# Patient Record
Sex: FEMALE | Race: WHITE | Employment: FULL TIME | URBAN - METROPOLITAN AREA
[De-identification: names, ages, dates, MRNs, and addresses within clinical notes are randomized per-mention and may not be internally consistent; named-entity substitution may affect disease eponyms.]

---

## 2018-06-13 ENCOUNTER — OFFICE VISIT (OUTPATIENT)
Dept: URGENT CARE | Age: 58
End: 2018-06-13

## 2018-06-13 VITALS
HEIGHT: 70 IN | SYSTOLIC BLOOD PRESSURE: 110 MMHG | RESPIRATION RATE: 12 BRPM | TEMPERATURE: 98.7 F | HEART RATE: 68 BPM | WEIGHT: 140 LBS | BODY MASS INDEX: 20.04 KG/M2 | DIASTOLIC BLOOD PRESSURE: 60 MMHG

## 2018-06-13 DIAGNOSIS — L50.9 URTICARIA: Primary | ICD-10-CM

## 2018-06-13 PROCEDURE — 99202 OFFICE O/P NEW SF 15 MIN: CPT | Performed by: PHYSICIAN ASSISTANT

## 2018-06-13 PROCEDURE — 96372 THER/PROPH/DIAG INJ SC/IM: CPT | Performed by: PHYSICIAN ASSISTANT

## 2018-06-13 RX ORDER — PREDNISONE 20 MG/1
TABLET ORAL
Qty: 18 TABLET | Refills: 0 | Status: SHIPPED | OUTPATIENT
Start: 2018-06-13 | End: 2018-09-10

## 2018-06-13 RX ORDER — DEXAMETHASONE SODIUM PHOSPHATE 10 MG/ML
10 INJECTION INTRAMUSCULAR; INTRAVENOUS ONCE
Status: COMPLETED | OUTPATIENT
Start: 2018-06-13 | End: 2018-06-13

## 2018-06-13 RX ORDER — HYDROXYZINE HYDROCHLORIDE 25 MG/1
25 TABLET, FILM COATED ORAL 3 TIMES DAILY PRN
Qty: 20 TABLET | Refills: 0 | Status: SHIPPED | OUTPATIENT
Start: 2018-06-13 | End: 2018-06-23

## 2018-06-13 RX ADMIN — DEXAMETHASONE SODIUM PHOSPHATE 10 MG: 10 INJECTION INTRAMUSCULAR; INTRAVENOUS at 15:58

## 2018-06-14 ASSESSMENT — ENCOUNTER SYMPTOMS
WHEEZING: 0
SHORTNESS OF BREATH: 0
VOMITING: 0
ABDOMINAL PAIN: 0
NAUSEA: 0

## 2018-09-10 ENCOUNTER — OFFICE VISIT (OUTPATIENT)
Dept: INTERNAL MEDICINE CLINIC | Age: 58
End: 2018-09-10

## 2018-09-10 VITALS
RESPIRATION RATE: 10 BRPM | WEIGHT: 136.8 LBS | DIASTOLIC BLOOD PRESSURE: 64 MMHG | BODY MASS INDEX: 20.26 KG/M2 | SYSTOLIC BLOOD PRESSURE: 112 MMHG | HEIGHT: 69 IN | HEART RATE: 76 BPM

## 2018-09-10 DIAGNOSIS — Z13.220 LIPID SCREENING: ICD-10-CM

## 2018-09-10 DIAGNOSIS — Z12.39 BREAST CANCER SCREENING: ICD-10-CM

## 2018-09-10 DIAGNOSIS — L85.3 DRY SKIN: ICD-10-CM

## 2018-09-10 DIAGNOSIS — Z71.85 VACCINE COUNSELING: ICD-10-CM

## 2018-09-10 DIAGNOSIS — Z72.0 TOBACCO USE: ICD-10-CM

## 2018-09-10 DIAGNOSIS — K90.0 CELIAC SPRUE: ICD-10-CM

## 2018-09-10 DIAGNOSIS — Z00.00 WELL ADULT EXAM: ICD-10-CM

## 2018-09-10 DIAGNOSIS — Z12.11 COLON CANCER SCREENING: ICD-10-CM

## 2018-09-10 DIAGNOSIS — Z23 NEED FOR TDAP VACCINATION: Primary | ICD-10-CM

## 2018-09-10 DIAGNOSIS — Z78.0 MENOPAUSE: ICD-10-CM

## 2018-09-10 LAB
BASOPHILS ABSOLUTE: 0.1 K/UL (ref 0–0.2)
BASOPHILS RELATIVE PERCENT: 0.9 %
EOSINOPHILS ABSOLUTE: 0.2 K/UL (ref 0–0.6)
EOSINOPHILS RELATIVE PERCENT: 3.1 %
HCT VFR BLD CALC: 45.4 % (ref 36–48)
HEMOGLOBIN: 15.1 G/DL (ref 12–16)
LYMPHOCYTES ABSOLUTE: 1.8 K/UL (ref 1–5.1)
LYMPHOCYTES RELATIVE PERCENT: 27.6 %
MCH RBC QN AUTO: 31.7 PG (ref 26–34)
MCHC RBC AUTO-ENTMCNC: 33.1 G/DL (ref 31–36)
MCV RBC AUTO: 95.6 FL (ref 80–100)
MONOCYTES ABSOLUTE: 0.6 K/UL (ref 0–1.3)
MONOCYTES RELATIVE PERCENT: 9.7 %
NEUTROPHILS ABSOLUTE: 3.7 K/UL (ref 1.7–7.7)
NEUTROPHILS RELATIVE PERCENT: 58.7 %
PDW BLD-RTO: 14.4 % (ref 12.4–15.4)
PLATELET # BLD: 306 K/UL (ref 135–450)
PMV BLD AUTO: 9.7 FL (ref 5–10.5)
RBC # BLD: 4.75 M/UL (ref 4–5.2)
WBC # BLD: 6.4 K/UL (ref 4–11)

## 2018-09-10 PROCEDURE — 99386 PREV VISIT NEW AGE 40-64: CPT | Performed by: INTERNAL MEDICINE

## 2018-09-10 PROCEDURE — 90715 TDAP VACCINE 7 YRS/> IM: CPT | Performed by: INTERNAL MEDICINE

## 2018-09-10 PROCEDURE — 90471 IMMUNIZATION ADMIN: CPT | Performed by: INTERNAL MEDICINE

## 2018-09-10 RX ORDER — VARENICLINE TARTRATE 25 MG
KIT ORAL
Qty: 53 EACH | Refills: 0 | Status: SHIPPED | OUTPATIENT
Start: 2018-09-10 | End: 2018-11-05 | Stop reason: ALTCHOICE

## 2018-09-10 RX ORDER — VARENICLINE TARTRATE 1 MG/1
1 TABLET, FILM COATED ORAL 2 TIMES DAILY
Qty: 60 TABLET | Refills: 5 | Status: SHIPPED | OUTPATIENT
Start: 2018-09-10 | End: 2018-11-05 | Stop reason: ALTCHOICE

## 2018-09-10 ASSESSMENT — ENCOUNTER SYMPTOMS
CHEST TIGHTNESS: 0
CONSTIPATION: 0
SHORTNESS OF BREATH: 0
DIARRHEA: 0

## 2018-09-10 ASSESSMENT — PATIENT HEALTH QUESTIONNAIRE - PHQ9
1. LITTLE INTEREST OR PLEASURE IN DOING THINGS: 0
SUM OF ALL RESPONSES TO PHQ QUESTIONS 1-9: 0
SUM OF ALL RESPONSES TO PHQ QUESTIONS 1-9: 0
2. FEELING DOWN, DEPRESSED OR HOPELESS: 0
SUM OF ALL RESPONSES TO PHQ9 QUESTIONS 1 & 2: 0

## 2018-09-10 NOTE — PROGRESS NOTES
days, then one tablet po qd x 3 days (Patient taking differently: Take one tablet po TID x 3 days, one tablet po BID x 3 days, then one tablet po qd x 3 days) 18 tablet 0     No facility-administered medications prior to visit. Patient's past medical history, surgical history, family history, medications,  and allergies  were all reviewed and updated as appropriate today. Review of Systems   Constitutional: Negative for appetite change, fatigue and fever. Respiratory: Negative for chest tightness and shortness of breath. Cardiovascular: Negative for chest pain. Gastrointestinal: Negative for constipation and diarrhea. Skin: Negative for rash. /64 (Site: Right Upper Arm)   Pulse 76   Resp 10   Ht 5' 9.25\" (1.759 m)   Wt 136 lb 12.8 oz (62.1 kg)   BMI 20.06 kg/m²   Physical Exam   Constitutional: She is oriented to person, place, and time. She appears well-developed and well-nourished. She is cooperative. She does not appear ill. No distress. HENT:   Head: Normocephalic. Right Ear: Tympanic membrane, external ear and ear canal normal.   Left Ear: Tympanic membrane, external ear and ear canal normal.   Nose: Nose normal. No mucosal edema or rhinorrhea. Mouth/Throat: Oropharynx is clear and moist and mucous membranes are normal.   Eyes: Pupils are equal, round, and reactive to light. Conjunctivae and EOM are normal. Right eye exhibits no discharge. Left eye exhibits no discharge. No scleral icterus. Neck: Normal range of motion. Neck supple. Carotid bruit is not present. No thyroid mass and no thyromegaly present. Cardiovascular: Normal rate, regular rhythm, normal heart sounds and intact distal pulses. No murmur heard. Pulses:       Dorsalis pedis pulses are 2+ on the right side, and 2+ on the left side. No LE edema   Pulmonary/Chest: Effort normal. She has no wheezes. She has no rales. She exhibits no tenderness. Abdominal: Soft.  Bowel sounds are normal. She exhibits no mass. There is no tenderness. Lymphadenopathy:     She has no cervical adenopathy. Neurological: She is alert and oriented to person, place, and time. Skin: Skin is warm and dry. No pallor. Psychiatric: She has a normal mood and affect. Her behavior is normal. Judgment and thought content normal.       ASSESSMENT/PLAN:    Problem List Items Addressed This Visit     Well adult exam     No problems identified on exam. Patient wants to quit smoking and this has been discussed. Orders given for mammogram and colonoscopy. Check fasting labs. TDaP given today and will get her flu shot in early-mid October. Vaccine counseling     Recommend TDaP now as she is expecting her first grandchild in October. Recommend flu shot in October. Tobacco use     Wants to quit smoking. After discussing medication and non-medication options and she would like to try Chantix. Risks of Chantix discussed with patient. Also discussed life style changes to help with quitting- limiting locations that she smokes, nicotine replacement. Physical vs emotional addiction to cigarettes. Relevant Medications    varenicline (CHANTIX STARTING MONTH PAK) 0.5 MG X 11 & 1 MG X 42 tablet    varenicline (CHANTIX CONTINUING MONTH PAK) 1 MG tablet    Menopause     Check DEXA scan, particularly with low BMI and Celiac disease. Dry skin     Recommend using OTC hydrocortisone cream applying a thin layer to affected areas twice daily as needed for no longer than 2 weeks. Colon cancer screening     Overdue for screening colonoscopy. Order given. Relevant Orders    COLONOSCOPY (Screening)    Celiac sprue     Diagnosed in her 20's and well controlled with diet. Does not follow with GI. Is due for colonoscopy, will need to see GI again.           Relevant Orders    Comprehensive Metabolic Panel (Completed)    Vitamin D 25 Hydroxy (Completed)    TSH with Reflex (Completed)    CBC Auto Differential (Completed)    Vitamin B12 & Folate (Completed)    Magnesium (Completed)    DEXA Bone Density Axial Skeleton    Breast cancer screening     Overdue for mammogram. Order given. Relevant Orders    MELISSA TESSIE DIGITAL SCREEN BILATERAL      Other Visit Diagnoses     Need for Tdap vaccination    -  Primary    Relevant Orders    Tdap (age 10y-63y) IM (ADACEL) (Completed)    Lipid screening        Relevant Orders    Lipid Panel (Completed)          Current Outpatient Prescriptions   Medication Sig Dispense Refill    varenicline (CHANTIX STARTING MONTH DARNELL) 0.5 MG X 11 & 1 MG X 42 tablet Take by mouth with food as directed by pack. 53 each 0    varenicline (CHANTIX CONTINUING MONTH PAK) 1 MG tablet Take 1 tablet by mouth 2 times daily Take with food 60 tablet 5     No current facility-administered medications for this visit. Return in about 6 weeks (around 10/22/2018) for sooner if needed.

## 2018-09-10 NOTE — PATIENT INSTRUCTIONS
Start Chantinx Starting Kit first and then take the prescription for the 1 mg pills. Take with food. Start Chantix and pick a quit date 1-2 weeks after you start the medication. Chantix will help decrease your craving for cigarettes and sometimes people stop smoking sooner than their chosen quit date. I do recommend taking Chantix for at least 3 months, ideally 6 months. You may use nicotine replacement, such as nicotine gum or patches, along with Chantix. The most common side effects with Chantix are nausea, so make sure to take with a full meal, and nightmares. These may be dose dependent, meaning that at lower doses you will not have the side effects. If this is the case, we can drop your dose down to the the lower dose which is often effective. Rarely, patients may experience worsening depression/new onset depression and/or suicidal thoughts. If this happens, please stop Chantix immediately and call the office. Try over the counter hydrocortisone cream apply a thin layer twice a day for no more than 2 weeks at a time, take a 2 week break between episodes to avoid thinning your skin. Patient Education        Stopping Smoking: Care Instructions  Your Care Instructions  Cigarette smokers crave the nicotine in cigarettes. Giving it up is much harder than simply changing a habit. Your body has to stop craving the nicotine. It is hard to quit, but you can do it. There are many tools that people use to quit smoking. You may find that combining tools works best for you. There are several steps to quitting. First you get ready to quit. Then you get support to help you. After that, you learn new skills and behaviors to become a nonsmoker. For many people, a necessary step is getting and using medicine. Your doctor will help you set up the plan that best meets your needs. You may want to attend a smoking cessation program to help you quit smoking.  When you choose a program, look for one that has proven skip a coffee break with coworkers who smoke. ¨ Change your daily routine. Take a different route to work or eat a meal in a different place. · Cut down on stress. Calm yourself or release tension by doing an activity you enjoy, such as reading a book, taking a hot bath, or gardening. · Talk to your doctor or pharmacist about nicotine replacement therapy, which replaces the nicotine in your body. You still get nicotine but you do not use tobacco. Nicotine replacement products help you slowly reduce the amount of nicotine you need. These products come in several forms, many of them available over-the-counter:  ¨ Nicotine patches  ¨ Nicotine gum and lozenges  ¨ Nicotine inhaler  · Ask your doctor about bupropion (Wellbutrin) or varenicline (Chantix), which are prescription medicines. They do not contain nicotine. They help you by reducing withdrawal symptoms, such as stress and anxiety. · Some people find hypnosis, acupuncture, and massage helpful for ending the smoking habit. · Eat a healthy diet and get regular exercise. Having healthy habits will help your body move past its craving for nicotine. · Be prepared to keep trying. Most people are not successful the first few times they try to quit. Do not get mad at yourself if you smoke again. Make a list of things you learned and think about when you want to try again, such as next week, next month, or next year. Where can you learn more? Go to https://GMIjosue.WorldWinger. org and sign in to your Mirantis account. Enter I328 in the [facility] box to learn more about \"Stopping Smoking: Care Instructions. \"     If you do not have an account, please click on the \"Sign Up Now\" link. Current as of: November 29, 2017  Content Version: 11.7  © 7758-1263 [employer], [employer]. Care instructions adapted under license by [facility] ([facility]).  If you have questions about a medical condition or this instruction, always ask your healthcare professional. to get colds, flu, bronchitis, and pneumonia. · As a nonsmoker, you may find that your mood is better and you are less stressed. When and how will you feel healthier? Quitting has real health benefits that start from day 1 of being smoke-free. And the longer you stay smoke-free, the healthier you get and the better you feel. The first hours  · After just 20 minutes, your blood pressure and heart rate go down. That means there's less stress on your heart and blood vessels. · Within 12 hours, the level of carbon monoxide in your blood drops back to normal. That makes room for more oxygen. With more oxygen in your body, you may notice that you have more energy than when you smoked. After 2 weeks  · Your lungs start to work better. · Your risk of heart attack starts to drop. After 1 month  · When your lungs are clear, you cough less and breathe deeper, so it's easier to be active. · Your sense of taste and smell return. That means you can enjoy food more than you have since you started smoking. Over the years  · After 1 year, your risk of heart disease is half what it would be if you kept smoking. · After 5 years, your risk of stroke starts to shrink. Within a few years after that, it's about the same as if you'd never smoked. · After 10 years, your risk of dying from lung cancer is cut by about half. And your risk for many other types of cancer is lower too. How would quitting help others in your life? When you quit smoking, you improve the health of everyone who now breathes in your smoke. · Their heart, lung, and cancer risks drop, much like yours. · They are sick less. For babies and small children, living smoke-free means they're less likely to have ear infections, pneumonia, and bronchitis. · If you're a woman who is or will be pregnant someday, quitting smoking means a healthier . · Children who are close to you are less likely to become adult smokers. Where can you learn more?   Go to shade or cover up with clothing and a hat with a wide brim. Wear sunglasses that block UV rays. Even when it's cloudy, put broad-spectrum sunscreen (SPF 30 or higher) on any exposed skin. · See a dentist one or two times a year for checkups and to have your teeth cleaned. · Wear a seat belt in the car. · Limit alcohol to 1 drink a day. Too much alcohol can cause health problems. Follow your doctor's advice about when to have certain tests. These tests can spot problems early. · Cholesterol. Your doctor will tell you how often to have this done based on your age, family history, or other things that can increase your risk for heart attack and stroke. · Blood pressure. Have your blood pressure checked during a routine doctor visit. Your doctor will tell you how often to check your blood pressure based on your age, your blood pressure results, and other factors. · Mammogram. Ask your doctor how often you should have a mammogram, which is an X-ray of your breasts. A mammogram can spot breast cancer before it can be felt and when it is easiest to treat. · Pap test and pelvic exam. Ask your doctor how often you should have a Pap test. You may not need to have a Pap test as often as you used to. · Vision. Have your eyes checked every year or two or as often as your doctor suggests. Some experts recommend that you have yearly exams for glaucoma and other age-related eye problems starting at age 48. · Hearing. Tell your doctor if you notice any change in your hearing. You can have tests to find out how well you hear. · Diabetes. Ask your doctor whether you should have tests for diabetes. · Colon cancer. You should begin tests for colon cancer at age 48. You may have one of several tests. Your doctor will tell you how often to have tests based on your age and risk.  Risks include whether you already had a precancerous polyp removed from your colon or whether your parents, sisters and brothers, or children have had colon cancer. · Thyroid disease. Talk to your doctor about whether to have your thyroid checked as part of a regular physical exam. Women have an increased chance of a thyroid problem. · Osteoporosis. You should begin tests for bone density at age 72. If you are younger than 72, ask your doctor whether you have factors that may increase your risk for this disease. You may want to have this test before age 72. · Heart attack and stroke risk. At least every 4 to 6 years, you should have your risk for heart attack and stroke assessed. Your doctor uses factors such as your age, blood pressure, cholesterol, and whether you smoke or have diabetes to show what your risk for a heart attack or stroke is over the next 10 years. When should you call for help? Watch closely for changes in your health, and be sure to contact your doctor if you have any problems or symptoms that concern you. Where can you learn more? Go to https://Leap Medicalpepicewallyssa.[employer]. org and sign in to your Yellloh account. Enter P550 in the [employer].fm box to learn more about \"Well Visit, Women 50 to 72: Care Instructions. \"     If you do not have an account, please click on the \"Sign Up Now\" link. Current as of: May 16, 2017  Content Version: 11.7  © 4212-5371 [employer], [employer]or. Care instructions adapted under license by [facility] ([facility]). If you have questions about a medical condition or this instruction, always ask your healthcare professional. [place] [address] any warranty or liability for your use of this information. Patient Education        Dry Skin: Care Instructions  Your Care Instructions  Dry skin is a common problem, especially in areas where the air is very dry. Dry skin can also become a problem as you get older and lose natural oils that keep your skin moist.  A tendency toward dry, itchy skin may run in families.  Some problems with the body's defenses (immune system), allergies, or an infection with a fungus may also cause patches of dry skin. An over-the-counter cream may help your dry skin. If your skin problem does not get better with home treatment, your doctor may prescribe ointment. You may need antibiotics if you have a skin infection. Follow-up care is a key part of your treatment and safety. Be sure to make and go to all appointments, and call your doctor if you are having problems. It's also a good idea to know your test results and keep a list of the medicines you take. How can you care for yourself at home? Showers and baths  · Keep showers and baths short, and use warm or lukewarm water. Don't use hot water. It takes off more of your skin's natural oils. · Use as little soap as you can. Choose a mild soap, such as Dove, Cetaphil, or Neutrogena. Or use a skin cleanser like Aquanil or Cetaphil. · If you are taking a bath, use soap only at the very end. Then rinse off all traces of soap with fresh water. Gently pat your skin dry with a towel. Skin creams and moisturizers  · Apply moisturizer or skin cream right away (within 3 minutes) after a bath or shower. Use a moisturizer at other times too, as often as you need it. · Moisturizing creams are better than lotions. Try brands like CeraVe cream, Cetaphil cream, or Eucerin cream.  Other tips  · When washing clothes, use a small amount of detergent. Don't use fabric softeners or dryer sheets. · For small areas of itchy skin, try an over-the-counter 1% hydrocortisone cream.  · If you have very dry hands, spread petroleum jelly (such as Vaseline) on your hands before bed. Wear thin cotton gloves while you sleep. If your feet are dry, spread Vaseline on them and wear socks while you sleep. When should you call for help? Call your doctor now or seek immediate medical care if:    · You have signs of infection, such as:  ¨ Pain, warmth, or swelling in the skin. ¨ Red streaks near a wound in the skin.   ¨ Pus coming from a wound in your skin. ¨ A fever.    Watch closely for changes in your health, and be sure to contact your doctor if:    · You do not get better as expected. Where can you learn more? Go to https://Subject Companycarloseb.[facility]. org and sign in to your 6Wunderkinder account. Enter V561 in the [facility] box to learn more about \"Dry Skin: Care Instructions. \"     If you do not have an account, please click on the \"Sign Up Now\" link. Current as of: October 5, 2017  Content Version: 11.7  © 5988-6109 [employer]. Care instructions adapted under license by [facility] ([facility]). If you have questions about a medical condition or this instruction, always ask your healthcare professional. [address] any warranty or liability for your use of this information. Patient Education        Celiac Disease: Care Instructions  Your Care Instructions  Celiac disease (or celiac sprue) is a problem with digesting gluten. Gluten is a type of protein found in wheat, rye, and other grains. This problem starts when the body's immune system attacks the small intestine when gluten is eaten. The immune system is supposed to fight off viruses and other invaders, but sometimes it turns on the person's own body. (This is called an autoimmune disease.) Celiac disease seems to run in families. Celiac disease causes damage to the small intestine. This makes it hard for the body to absorb vitamins and other nutrients. You cannot prevent celiac disease. But you can stop and reverse the damage to the small intestine by eating a strict gluten-free diet. Follow-up care is a key part of your treatment and safety. Be sure to make and go to all appointments, and call your doctor if you are having problems. It's also a good idea to know your test results and keep a list of the medicines you take. How can you care for yourself at home? · Eat a gluten-free diet to prevent symptoms and damage to the small intestine.  Even a

## 2018-09-11 LAB
A/G RATIO: 1.8 (ref 1.1–2.2)
ALBUMIN SERPL-MCNC: 4.7 G/DL (ref 3.4–5)
ALP BLD-CCNC: 55 U/L (ref 40–129)
ALT SERPL-CCNC: 7 U/L (ref 10–40)
ANION GAP SERPL CALCULATED.3IONS-SCNC: 12 MMOL/L (ref 3–16)
AST SERPL-CCNC: 17 U/L (ref 15–37)
BILIRUB SERPL-MCNC: 0.6 MG/DL (ref 0–1)
BUN BLDV-MCNC: 12 MG/DL (ref 7–20)
CALCIUM SERPL-MCNC: 10.1 MG/DL (ref 8.3–10.6)
CHLORIDE BLD-SCNC: 103 MMOL/L (ref 99–110)
CHOLESTEROL, TOTAL: 151 MG/DL (ref 0–199)
CO2: 24 MMOL/L (ref 21–32)
CREAT SERPL-MCNC: 0.7 MG/DL (ref 0.6–1.1)
FOLATE: >20 NG/ML (ref 4.78–24.2)
GFR AFRICAN AMERICAN: >60
GFR NON-AFRICAN AMERICAN: >60
GLOBULIN: 2.6 G/DL
GLUCOSE BLD-MCNC: 88 MG/DL (ref 70–99)
HDLC SERPL-MCNC: 53 MG/DL (ref 40–60)
LDL CHOLESTEROL CALCULATED: 85 MG/DL
MAGNESIUM: 2.3 MG/DL (ref 1.8–2.4)
POTASSIUM SERPL-SCNC: 5.3 MMOL/L (ref 3.5–5.1)
SODIUM BLD-SCNC: 139 MMOL/L (ref 136–145)
TOTAL PROTEIN: 7.3 G/DL (ref 6.4–8.2)
TRIGL SERPL-MCNC: 65 MG/DL (ref 0–150)
TSH REFLEX: 0.69 UIU/ML (ref 0.27–4.2)
VITAMIN B-12: 591 PG/ML (ref 211–911)
VITAMIN D 25-HYDROXY: 30.4 NG/ML
VLDLC SERPL CALC-MCNC: 13 MG/DL

## 2018-09-16 NOTE — ASSESSMENT & PLAN NOTE
Recommend TDaP now as she is expecting her first grandchild in October. Recommend flu shot in October.

## 2018-09-17 ENCOUNTER — TELEPHONE (OUTPATIENT)
Dept: INTERNAL MEDICINE CLINIC | Age: 58
End: 2018-09-17

## 2018-09-17 NOTE — TELEPHONE ENCOUNTER
Patient is aware that  wanted her to have a repeat  K+ due to elevated K+ from 9/11/18. She does have an appointment on 9/22/18. If test is not considered critical, she would like to have it done then. She has to take off work every time she comes in for something. Also she is not taking any kind of K+ supplement.

## 2018-09-24 ENCOUNTER — HOSPITAL ENCOUNTER (OUTPATIENT)
Dept: WOMENS IMAGING | Age: 58
Discharge: HOME | End: 2018-09-24
Payer: COMMERCIAL

## 2018-09-24 DIAGNOSIS — Z12.39 BREAST CANCER SCREENING: ICD-10-CM

## 2018-09-24 PROCEDURE — 77067 SCR MAMMO BI INCL CAD: CPT

## 2018-10-04 ENCOUNTER — ANESTHESIA (OUTPATIENT)
Dept: ENDOSCOPY | Age: 58
End: 2018-10-04
Payer: COMMERCIAL

## 2018-10-04 ENCOUNTER — HOSPITAL ENCOUNTER (OUTPATIENT)
Age: 58
Setting detail: OUTPATIENT SURGERY
Discharge: HOME | End: 2018-10-04
Attending: INTERNAL MEDICINE | Admitting: INTERNAL MEDICINE
Payer: COMMERCIAL

## 2018-10-04 ENCOUNTER — ANESTHESIA EVENT (OUTPATIENT)
Dept: ENDOSCOPY | Age: 58
End: 2018-10-04
Payer: COMMERCIAL

## 2018-10-04 VITALS
RESPIRATION RATE: 16 BRPM | BODY MASS INDEX: 20.04 KG/M2 | HEART RATE: 52 BPM | TEMPERATURE: 97.9 F | OXYGEN SATURATION: 99 % | SYSTOLIC BLOOD PRESSURE: 107 MMHG | DIASTOLIC BLOOD PRESSURE: 73 MMHG | WEIGHT: 140 LBS | HEIGHT: 70 IN

## 2018-10-04 VITALS — DIASTOLIC BLOOD PRESSURE: 56 MMHG | OXYGEN SATURATION: 97 % | SYSTOLIC BLOOD PRESSURE: 110 MMHG

## 2018-10-04 PROCEDURE — 2580000003 HC RX 258: Performed by: ANESTHESIOLOGY

## 2018-10-04 PROCEDURE — 3700000000 HC ANESTHESIA ATTENDED CARE: Performed by: INTERNAL MEDICINE

## 2018-10-04 PROCEDURE — 2500000003 HC RX 250 WO HCPCS: Performed by: NURSE ANESTHETIST, CERTIFIED REGISTERED

## 2018-10-04 PROCEDURE — 2709999900 HC NON-CHARGEABLE SUPPLY: Performed by: INTERNAL MEDICINE

## 2018-10-04 PROCEDURE — 7100000011 HC PHASE II RECOVERY - ADDTL 15 MIN: Performed by: INTERNAL MEDICINE

## 2018-10-04 PROCEDURE — 6360000002 HC RX W HCPCS: Performed by: NURSE ANESTHETIST, CERTIFIED REGISTERED

## 2018-10-04 PROCEDURE — 7100000010 HC PHASE II RECOVERY - FIRST 15 MIN: Performed by: INTERNAL MEDICINE

## 2018-10-04 PROCEDURE — 3700000001 HC ADD 15 MINUTES (ANESTHESIA): Performed by: INTERNAL MEDICINE

## 2018-10-04 PROCEDURE — 3609009500 HC COLONOSCOPY DIAGNOSTIC OR SCREENING: Performed by: INTERNAL MEDICINE

## 2018-10-04 RX ORDER — PROPOFOL 10 MG/ML
INJECTION, EMULSION INTRAVENOUS PRN
Status: DISCONTINUED | OUTPATIENT
Start: 2018-10-04 | End: 2018-10-04 | Stop reason: SDUPTHER

## 2018-10-04 RX ORDER — FENTANYL CITRATE 50 UG/ML
25 INJECTION, SOLUTION INTRAMUSCULAR; INTRAVENOUS EVERY 5 MIN PRN
Status: DISCONTINUED | OUTPATIENT
Start: 2018-10-04 | End: 2018-10-04 | Stop reason: HOSPADM

## 2018-10-04 RX ORDER — MEPERIDINE HYDROCHLORIDE 25 MG/ML
12.5 INJECTION INTRAMUSCULAR; INTRAVENOUS; SUBCUTANEOUS EVERY 5 MIN PRN
Status: DISCONTINUED | OUTPATIENT
Start: 2018-10-04 | End: 2018-10-04 | Stop reason: HOSPADM

## 2018-10-04 RX ORDER — FENTANYL CITRATE 50 UG/ML
50 INJECTION, SOLUTION INTRAMUSCULAR; INTRAVENOUS EVERY 5 MIN PRN
Status: DISCONTINUED | OUTPATIENT
Start: 2018-10-04 | End: 2018-10-04 | Stop reason: HOSPADM

## 2018-10-04 RX ORDER — OXYCODONE HYDROCHLORIDE 5 MG/1
5 TABLET ORAL PRN
Status: DISCONTINUED | OUTPATIENT
Start: 2018-10-04 | End: 2018-10-04 | Stop reason: HOSPADM

## 2018-10-04 RX ORDER — SODIUM CHLORIDE 0.9 % (FLUSH) 0.9 %
10 SYRINGE (ML) INJECTION PRN
Status: DISCONTINUED | OUTPATIENT
Start: 2018-10-04 | End: 2018-10-04 | Stop reason: HOSPADM

## 2018-10-04 RX ORDER — OXYCODONE HYDROCHLORIDE 5 MG/1
10 TABLET ORAL PRN
Status: DISCONTINUED | OUTPATIENT
Start: 2018-10-04 | End: 2018-10-04 | Stop reason: HOSPADM

## 2018-10-04 RX ORDER — LIDOCAINE HYDROCHLORIDE 10 MG/ML
1 INJECTION, SOLUTION EPIDURAL; INFILTRATION; INTRACAUDAL; PERINEURAL
Status: CANCELLED | OUTPATIENT
Start: 2018-10-04 | End: 2018-10-04

## 2018-10-04 RX ORDER — HYDROMORPHONE HCL 110MG/55ML
0.25 PATIENT CONTROLLED ANALGESIA SYRINGE INTRAVENOUS EVERY 5 MIN PRN
Status: DISCONTINUED | OUTPATIENT
Start: 2018-10-04 | End: 2018-10-04 | Stop reason: HOSPADM

## 2018-10-04 RX ORDER — SODIUM CHLORIDE 0.9 % (FLUSH) 0.9 %
10 SYRINGE (ML) INJECTION EVERY 12 HOURS SCHEDULED
Status: DISCONTINUED | OUTPATIENT
Start: 2018-10-04 | End: 2018-10-04 | Stop reason: HOSPADM

## 2018-10-04 RX ORDER — HYDROMORPHONE HCL 110MG/55ML
0.5 PATIENT CONTROLLED ANALGESIA SYRINGE INTRAVENOUS EVERY 5 MIN PRN
Status: DISCONTINUED | OUTPATIENT
Start: 2018-10-04 | End: 2018-10-04 | Stop reason: HOSPADM

## 2018-10-04 RX ORDER — ONDANSETRON 2 MG/ML
4 INJECTION INTRAMUSCULAR; INTRAVENOUS PRN
Qty: 84 ML | Status: CANCELLED | OUTPATIENT
Start: 2018-10-04

## 2018-10-04 RX ORDER — ONDANSETRON 2 MG/ML
4 INJECTION INTRAMUSCULAR; INTRAVENOUS PRN
Status: DISCONTINUED | OUTPATIENT
Start: 2018-10-04 | End: 2018-10-04 | Stop reason: HOSPADM

## 2018-10-04 RX ORDER — LIDOCAINE HYDROCHLORIDE 20 MG/ML
INJECTION, SOLUTION INFILTRATION; PERINEURAL PRN
Status: DISCONTINUED | OUTPATIENT
Start: 2018-10-04 | End: 2018-10-04 | Stop reason: SDUPTHER

## 2018-10-04 RX ORDER — ONDANSETRON 2 MG/ML
4 INJECTION INTRAMUSCULAR; INTRAVENOUS
Status: DISCONTINUED | OUTPATIENT
Start: 2018-10-04 | End: 2018-10-04 | Stop reason: HOSPADM

## 2018-10-04 RX ORDER — LIDOCAINE HYDROCHLORIDE 10 MG/ML
1 INJECTION, SOLUTION EPIDURAL; INFILTRATION; INTRACAUDAL; PERINEURAL
Status: DISCONTINUED | OUTPATIENT
Start: 2018-10-04 | End: 2018-10-04 | Stop reason: HOSPADM

## 2018-10-04 RX ORDER — SODIUM CHLORIDE 9 MG/ML
INJECTION, SOLUTION INTRAVENOUS CONTINUOUS
Status: DISCONTINUED | OUTPATIENT
Start: 2018-10-04 | End: 2018-10-04 | Stop reason: HOSPADM

## 2018-10-04 RX ADMIN — PROPOFOL 100 MG: 10 INJECTION, EMULSION INTRAVENOUS at 10:43

## 2018-10-04 RX ADMIN — PHENYLEPHRINE HYDROCHLORIDE 50 MCG: 10 INJECTION INTRAVENOUS at 10:37

## 2018-10-04 RX ADMIN — PROPOFOL 200 MG: 10 INJECTION, EMULSION INTRAVENOUS at 10:36

## 2018-10-04 RX ADMIN — PROPOFOL 20 MG: 10 INJECTION, EMULSION INTRAVENOUS at 10:51

## 2018-10-04 RX ADMIN — SODIUM CHLORIDE: 9 INJECTION, SOLUTION INTRAVENOUS at 10:01

## 2018-10-04 RX ADMIN — LIDOCAINE HYDROCHLORIDE 80 MG: 20 INJECTION, SOLUTION INFILTRATION; PERINEURAL at 10:41

## 2018-10-04 ASSESSMENT — PAIN - FUNCTIONAL ASSESSMENT: PAIN_FUNCTIONAL_ASSESSMENT: 0-10

## 2018-10-04 ASSESSMENT — PAIN SCALES - GENERAL
PAINLEVEL_OUTOF10: 0

## 2018-10-10 PROBLEM — Z00.00 WELL ADULT EXAM: Status: RESOLVED | Noted: 2018-09-10 | Resolved: 2018-10-10

## 2018-10-10 PROBLEM — Z12.11 COLON CANCER SCREENING: Status: RESOLVED | Noted: 2018-09-10 | Resolved: 2018-10-10

## 2018-10-10 PROBLEM — Z12.39 BREAST CANCER SCREENING: Status: RESOLVED | Noted: 2018-09-10 | Resolved: 2018-10-10

## 2018-11-05 ENCOUNTER — OFFICE VISIT (OUTPATIENT)
Dept: INTERNAL MEDICINE CLINIC | Age: 58
End: 2018-11-05
Payer: COMMERCIAL

## 2018-11-05 VITALS
DIASTOLIC BLOOD PRESSURE: 64 MMHG | RESPIRATION RATE: 10 BRPM | BODY MASS INDEX: 19.71 KG/M2 | HEART RATE: 80 BPM | WEIGHT: 137.4 LBS | SYSTOLIC BLOOD PRESSURE: 102 MMHG

## 2018-11-05 DIAGNOSIS — K57.30 DIVERTICULOSIS OF LARGE INTESTINE WITHOUT HEMORRHAGE: ICD-10-CM

## 2018-11-05 DIAGNOSIS — E55.9 VITAMIN D INSUFFICIENCY: ICD-10-CM

## 2018-11-05 DIAGNOSIS — Z72.0 TOBACCO USE: Primary | ICD-10-CM

## 2018-11-05 PROCEDURE — 99213 OFFICE O/P EST LOW 20 MIN: CPT | Performed by: INTERNAL MEDICINE

## 2018-11-05 ASSESSMENT — ENCOUNTER SYMPTOMS
DIARRHEA: 0
SHORTNESS OF BREATH: 0
CONSTIPATION: 0
CHEST TIGHTNESS: 0

## 2018-11-05 NOTE — PROGRESS NOTES
2.6                 09/10/2018              Lab Results       Component                Value               Date                       CHOL                     151                 09/10/2018            Lab Results       Component                Value               Date                       TRIG                     65                  09/10/2018            Lab Results       Component                Value               Date                       HDL                      53                  09/10/2018            Lab Results       Component                Value               Date                       LDLCALC                  85                  09/10/2018            Lab Results       Component                Value               Date                       LABVLDL                  13                  09/10/2018            No results found for: CHOLHDLRATIO  Magnesium 2.30  1.80 - 2.40 mg/dL Final 09/10/2018 12:24 PM [address] [facility]    Vitamin B-12 591  211 - 911 pg/mL Final 09/10/2018 12:24 PM [address] [facility]  Folate >20.00  4.78 - 24.20 ng/mL Final 09/10/2018 12:24 PM [facility]    TSH 0.69  0.27 - 4.20 uIU/mL Final 09/10/2018 12:24 PM [address] [facility]    Vit D, 25-Hydroxy 30.4  >=30 ng/mL Final 09/10/2018 12:24 PM [address] [facility]    15 minutes were spent with patient today in face to face encounter, more than 50% of it was counseling regarding diverticular disease, reviewing and interperting labs results .            Allergies   Allergen Reactions    Gluten Meal Diarrhea      Past Medical History:   Diagnosis Date    Celiac disease/sprue       Past Surgical History:   Procedure Laterality Date    COLONOSCOPY  10/04/2018    MANDIBLE RECONSTRUCTION      SD COLONOSCOPY FLX DX W/COLLJ SPEC WHEN PFRMD N/A 10/4/2018    COLONOSCOPY DIAGNOSTIC OR SCREENING performed by Vee Rios MD at [place] [address] Marital status:

## 2020-08-21 ENCOUNTER — TELEPHONE (OUTPATIENT)
Dept: INTERNAL MEDICINE CLINIC | Age: 60
End: 2020-08-21

## 2020-09-01 ENCOUNTER — OFFICE VISIT (OUTPATIENT)
Dept: INTERNAL MEDICINE CLINIC | Age: 60
End: 2020-09-01
Payer: COMMERCIAL

## 2020-09-01 VITALS
HEART RATE: 72 BPM | SYSTOLIC BLOOD PRESSURE: 98 MMHG | OXYGEN SATURATION: 99 % | BODY MASS INDEX: 20.4 KG/M2 | DIASTOLIC BLOOD PRESSURE: 78 MMHG | TEMPERATURE: 96.8 F | WEIGHT: 142.2 LBS

## 2020-09-01 DIAGNOSIS — R41.89 COGNITIVE IMPAIRMENT: ICD-10-CM

## 2020-09-01 DIAGNOSIS — E55.9 VITAMIN D INSUFFICIENCY: ICD-10-CM

## 2020-09-01 DIAGNOSIS — Z00.00 WELL ADULT EXAM: ICD-10-CM

## 2020-09-01 PROBLEM — F32.A DEPRESSIVE DISORDER: Status: ACTIVE | Noted: 2020-09-01

## 2020-09-01 LAB
A/G RATIO: 1.7 (ref 1.1–2.2)
ALBUMIN SERPL-MCNC: 4.5 G/DL (ref 3.4–5)
ALP BLD-CCNC: 55 U/L (ref 40–129)
ALT SERPL-CCNC: 8 U/L (ref 10–40)
ANION GAP SERPL CALCULATED.3IONS-SCNC: 10 MMOL/L (ref 3–16)
AST SERPL-CCNC: 15 U/L (ref 15–37)
BASOPHILS ABSOLUTE: 0.1 K/UL (ref 0–0.2)
BASOPHILS RELATIVE PERCENT: 2.1 %
BILIRUB SERPL-MCNC: 0.4 MG/DL (ref 0–1)
BUN BLDV-MCNC: 9 MG/DL (ref 7–20)
CALCIUM SERPL-MCNC: 10.1 MG/DL (ref 8.3–10.6)
CHLORIDE BLD-SCNC: 108 MMOL/L (ref 99–110)
CHOLESTEROL, TOTAL: 170 MG/DL (ref 0–199)
CO2: 27 MMOL/L (ref 21–32)
CREAT SERPL-MCNC: 0.8 MG/DL (ref 0.6–1.1)
EOSINOPHILS ABSOLUTE: 0.2 K/UL (ref 0–0.6)
EOSINOPHILS RELATIVE PERCENT: 2.9 %
FOLATE: 5.05 NG/ML (ref 4.78–24.2)
GFR AFRICAN AMERICAN: >60
GFR NON-AFRICAN AMERICAN: >60
GLOBULIN: 2.6 G/DL
GLUCOSE BLD-MCNC: 88 MG/DL (ref 70–99)
HCT VFR BLD CALC: 42.8 % (ref 36–48)
HDLC SERPL-MCNC: 61 MG/DL (ref 40–60)
HEMOGLOBIN: 14.1 G/DL (ref 12–16)
LDL CHOLESTEROL CALCULATED: 97 MG/DL
LYMPHOCYTES ABSOLUTE: 1.7 K/UL (ref 1–5.1)
LYMPHOCYTES RELATIVE PERCENT: 31 %
MCH RBC QN AUTO: 30.8 PG (ref 26–34)
MCHC RBC AUTO-ENTMCNC: 32.9 G/DL (ref 31–36)
MCV RBC AUTO: 93.6 FL (ref 80–100)
MONOCYTES ABSOLUTE: 0.6 K/UL (ref 0–1.3)
MONOCYTES RELATIVE PERCENT: 10.9 %
NEUTROPHILS ABSOLUTE: 2.9 K/UL (ref 1.7–7.7)
NEUTROPHILS RELATIVE PERCENT: 53.1 %
PDW BLD-RTO: 14.8 % (ref 12.4–15.4)
PLATELET # BLD: 316 K/UL (ref 135–450)
PMV BLD AUTO: 9.8 FL (ref 5–10.5)
POTASSIUM SERPL-SCNC: 5.4 MMOL/L (ref 3.5–5.1)
RBC # BLD: 4.57 M/UL (ref 4–5.2)
SODIUM BLD-SCNC: 145 MMOL/L (ref 136–145)
TOTAL PROTEIN: 7.1 G/DL (ref 6.4–8.2)
TRIGL SERPL-MCNC: 60 MG/DL (ref 0–150)
TSH REFLEX: 1.09 UIU/ML (ref 0.27–4.2)
VITAMIN B-12: 418 PG/ML (ref 211–911)
VITAMIN D 25-HYDROXY: 22.4 NG/ML
VLDLC SERPL CALC-MCNC: 12 MG/DL
WBC # BLD: 5.6 K/UL (ref 4–11)

## 2020-09-01 PROCEDURE — 99215 OFFICE O/P EST HI 40 MIN: CPT | Performed by: INTERNAL MEDICINE

## 2020-09-01 RX ORDER — FLUOXETINE 10 MG/1
10 TABLET, FILM COATED ORAL DAILY
Qty: 30 TABLET | Refills: 1 | Status: SHIPPED | OUTPATIENT
Start: 2020-09-01 | End: 2020-09-02

## 2020-09-01 ASSESSMENT — PATIENT HEALTH QUESTIONNAIRE - PHQ9
SUM OF ALL RESPONSES TO PHQ QUESTIONS 1-9: 1
1. LITTLE INTEREST OR PLEASURE IN DOING THINGS: 0
SUM OF ALL RESPONSES TO PHQ QUESTIONS 1-9: 1
SUM OF ALL RESPONSES TO PHQ9 QUESTIONS 1 & 2: 1
2. FEELING DOWN, DEPRESSED OR HOPELESS: 1

## 2020-09-01 NOTE — PROGRESS NOTES
Patient: Valeria Ojeda is a 64 y.o. female who presents today with the following Chief Complaint(s):  Chief Complaint   Patient presents with    Check-Up    Memory Loss       HPI     Brother accompanied visit on phone. Have been noticing some memory issues that seemed to start in July. Was at a family reunion in July and could not find her way out of a corridor. Couldn't not figure out how to help herself and sister had to go and help her. Could not recall message from sister. Lost key and was \"inappropriate\" with the [facility] staff when they asked to see identification. Confusion regarding cash/taking out cash, storing cash. Would appear to be withdrawn internally during family visit. Has noticed that she has been forgetting things. Feels like it has been \"ongoing\", not really sure how long. No problems paying bills. Has been having some problems at work- was hard to adjust on computer if was having an inbound or outbound calls and did not take inbound calls. No other instances at work. No instances of getting lost but states that she \"doesn't really drive that much and I use as a back up\". No difficulties with meals/meal prep. States that she does feel less happy. Did feel overwhelmed when she was out at the family reunion. Did not feel prepared or part of the family reunion as she not involved in the planning. Brother has noticed withdrawal.     Denies any hearing issues. No family h/o dementia. Mother is 80 and she is still very \"sharp\". Father  at 80 with intact memory. Has had anger management issues for \"years\". Lives alone, has been working from home since pandemic. Does not have any outside social interaction. Does not drink heavily- maybe 1 glass of wine a few nights per week. No exercise.      Allergies   Allergen Reactions    Gluten Meal Diarrhea      Past Medical History:   Diagnosis Date    Celiac disease/sprue       Past Surgical History:   Procedure Laterality Date    COLONOSCOPY  10/04/2018    MANDIBLE RECONSTRUCTION      MD COLONOSCOPY FLX DX W/COLLJ SPEC WHEN PFRMD N/A 10/4/2018    COLONOSCOPY DIAGNOSTIC OR SCREENING performed by Lynnette Pendleton MD at [address] History     Socioeconomic History    Marital status:      Spouse name: Not on file    Number of children: Not on file    Years of education: Not on file    Highest education level: Not on file   Occupational History    Occupation: customer service     Employer: [employer]   Social Needs    Financial resource strain: Not on file    Food insecurity     Worry: Not on file     Inability: Not on file    Transportation needs     Medical: Not on file     Non-medical: Not on file   Tobacco Use    Smoking status: Former Smoker     Packs/day: 0.50     Years: 20.00     Pack years: 10.00     Start date: 9/10/2018     Last attempt to quit: 9/10/2018     Years since quittin.9    Smokeless tobacco: Never Used   Substance and Sexual Activity    Alcohol use:  Yes     Alcohol/week: 3.0 standard drinks     Types: 3 Glasses of wine per week     Comment: wine    Drug use: No    Sexual activity: Not on file   Lifestyle    Physical activity     Days per week: Not on file     Minutes per session: Not on file    Stress: Not on file   Relationships    Social connections     Talks on phone: Not on file     Gets together: Not on file     Attends [employer] service: Not on file     Active member of club or organization: Not on file     Attends meetings of clubs or organizations: Not on file     Relationship status: Not on file    Intimate partner violence     Fear of current or ex partner: Not on file     Emotionally abused: Not on file     Physically abused: Not on file     Forced sexual activity: Not on file   Other Topics Concern    Not on file   Social History Narrative    Not on file     Family History   Problem Relation Age of Onset    High Blood Pressure Father     Diabetes Father     Alzheimer's Disease Mother         No outpatient medications prior to visit. No facility-administered medications prior to visit. Patient'spast medical history, surgical history, family history, medications,  and allergies  were all reviewed and updated as appropriate today. Review of Systems   Psychiatric/Behavioral: Positive for behavioral problems and dysphoric mood. Negative for sleep disturbance. The patient is not nervous/anxious. BP 98/78   Pulse 72   Temp 96.8 °F (36 °C)   Wt 142 lb 3.2 oz (64.5 kg)   SpO2 99%   BMI 20.40 kg/m²   Physical Exam  Constitutional:       General: She is not in acute distress. Appearance: Normal appearance. She is normal weight. She is not ill-appearing. Neurological:      Mental Status: She is alert and oriented to person, place, and time. Comments: [place] 22/30 9/1/2020   Psychiatric:         Attention and Perception: Attention normal.         Mood and Affect: Mood is depressed. Affect is tearful. Behavior: Behavior normal.         Cognition and Memory: Cognition is not impaired. She exhibits impaired remote memory. ASSESSMENT/PLAN:    Problem List Items Addressed This Visit     Celiac sprue    Cognitive impairment - Primary     Patient scored 22 out of 30 points on her [address] exam.  She did miss her serial sevens but was able to spell world backward appropriately so this score is likely artificially low. Patient's family in particular has noticed some functional decline in [place] with regards to her cognitive status. She has had some difficulties at work recently as well. She is complaining of some depression. Refer to psychiatry for help on eliciting how much of this may be depression related versus dementia related. Refer to neurology for further work-up and possible neuropsychiatric testing. Refer to psychology for help with depression. Check CT of head to rule out atrophy and previous strokes.   Check syphilis cascading antibody, TSH, R29, and folic acid and lipid panel. Relevant Orders    CT HEAD WO CONTRAST    [facility] - Maria Teresa Quevedo DO, Neurology, [place]    VITAMIN B12 & FOLATE    TSH with Reflex    Syphilis Antibody Cascading Reflex    Depressive disorder     Possibly contributing to cognitive impairment. Start Prozac 10 mg daily. Recheck in 4 weeks. Refer to psychiatry and psychology. Relevant Medications    FLUoxetine (PROZAC) 10 MG tablet    Other Relevant Orders    External Referral to Psychiatry    External Referral To Psychology    Vitamin D insufficiency    Relevant Orders    VITAMIN D 25 HYDROXY      Other Visit Diagnoses     Well adult exam        Relevant Orders    COMPREHENSIVE METABOLIC PANEL    CBC Auto Differential    Lipid Panel          Current Outpatient Medications   Medication Sig Dispense Refill    FLUoxetine (PROZAC) 10 MG tablet Take 1 tablet by mouth daily 30 tablet 1     No current facility-administered medications for this visit. 45 minutes were spent with patient today in face to face encounter, more than 50% of it was counseling regarding cognitive impairment . Return in about 4 weeks (around 9/29/2020).

## 2020-09-01 NOTE — ASSESSMENT & PLAN NOTE
Patient scored 22 out of 30 points on her [address] exam.  She did miss her serial sevens but was able to spell world backward appropriately so this score is likely artificially low. Patient's family in particular has noticed some functional decline in [place] with regards to her cognitive status. She has had some difficulties at work recently as well. She is complaining of some depression. Refer to psychiatry for help on eliciting how much of this may be depression related versus dementia related. Refer to neurology for further work-up and possible neuropsychiatric testing. Refer to psychology for help with depression. Check CT of head to rule out atrophy and previous strokes. Check syphilis cascading antibody, TSH, R76, and folic acid and lipid panel.

## 2020-09-01 NOTE — PATIENT INSTRUCTIONS
1. I am not sure if your cognitive issues are related to depression or possible early dementia. I am concerned. To help to \"tease\" this out, I have referred you to:  1) Dr. Jhonny Bates- a psychiatrist. [phone]. He is located in the same office as Dr. Pablo Rodriguez. Please make an appointment to see him when you check out today. 2) Dr. Gerardo Ledbetter- a psychologist [phone]. She is located in the same office as Dr. Pablo Rodriguez and is doing virtual visits as well. Please make an appointment with her when you check out today. 3) Dr. Jay Daniels- a neurologist. He will also help to comment on your cognitive concerns. Please call his office to schedule an appointment:  [facility] [address]  Phone: [phone]    I have ordered a CT scan of your brain to look for possible causes of dementia (strokes, previous trauma). Please call [phone])  to schedule your CT scan. I have also ordered several labs to check for possible reversible causes of cognitive impairment. For your memory- you need to do things to keep your brain active. Continue to read. Please see if you can join an online book club. This would be ideal as you do need the social interaction. Please try working puzzles- crossword, word searches, math puzzles, jigsaw puzzles. These will allow you to use different parts of your brain.  Walking/exercise will also help- start with 10 minutes 2-3 days per week and increase as you are able up to 30 minutes 5 days per week goal.

## 2020-09-01 NOTE — ASSESSMENT & PLAN NOTE
Possibly contributing to cognitive impairment. Start Prozac 10 mg daily. Recheck in 4 weeks. Refer to psychiatry and psychology.

## 2020-09-02 ENCOUNTER — TELEPHONE (OUTPATIENT)
Dept: INTERNAL MEDICINE CLINIC | Age: 60
End: 2020-09-02

## 2020-09-02 LAB — TOTAL SYPHILLIS IGG/IGM: NORMAL

## 2020-09-02 RX ORDER — FLUOXETINE 10 MG/1
10 CAPSULE ORAL DAILY
Qty: 30 CAPSULE | Refills: 3 | Status: SHIPPED | OUTPATIENT
Start: 2020-09-02 | End: 2020-11-10 | Stop reason: SDUPTHER

## 2020-09-02 NOTE — TELEPHONE ENCOUNTER
I did follow up on new script sent and the capsules are covered. They will call the pt when script is ready.

## 2020-09-06 ENCOUNTER — HOSPITAL ENCOUNTER (OUTPATIENT)
Dept: CT IMAGING | Age: 60
Discharge: HOME | End: 2020-09-06
Payer: COMMERCIAL

## 2020-09-06 PROCEDURE — 70450 CT HEAD/BRAIN W/O DYE: CPT

## 2020-09-28 ENCOUNTER — VIRTUAL VISIT (OUTPATIENT)
Dept: PSYCHOLOGY | Age: 60
End: 2020-09-28
Payer: COMMERCIAL

## 2020-09-28 PROCEDURE — 98968 PH1 ASSMT&MGMT NQHP 21-30: CPT | Performed by: PSYCHOLOGIST

## 2020-09-28 NOTE — PROGRESS NOTES
Behavioral Health Consultation  Estephania Veronica, Ph.D.  Clinical Psychologist  9/28/2020  8:31 AM      Time spent with Patient: 30 minutes  This is patient's first [facility] appointment. Reason for Consult:    Chief Complaint   Patient presents with    Stress       Pt provided informed consent for the behavioral health program. Discussed with patient model of service to include the limits of confidentiality (i.e. abuse reporting, suicide intervention, etc.) and short-term intervention focused approach. Pt indicated understanding. Feedback given to PCP. TELEHEALTH VISIT -- Audio Only (During [facility] emergency)  }  Pursuant to the emergency declaration under the [facility], 1135 waiver authority and the [employer] Act, this phone call was conducted, with patient's consent, to reduce the patient's risk of exposure to COVID-19 and provide continuity of care for an established patient. Services were provided through a phone call discussion to substitute for in-person clinic visit. Pt gave verbal informed consent to participate in telehealth services. Consent:  She and/or health care decision maker is aware that that she may receive a bill for this telephone service, depending on her insurance coverage, and has provided verbal consent to proceed: Yes    Conducted a risk-benefit analysis and determined that the patient's presenting problems are consistent with the use of telepsychology. Determined that the patient has sufficient knowledge and skills in the use of technology enabling them to adequately benefit from telepsychology. It was determined that this patient was able to be properly treated without an in-person session. Patient verified that they were currently located at the [facility] address that was provided during registration.     Verified the following information:  Patient's identification: Yes  Patient anxiety  Thought Content    intact  Thought Process    goal directed and coherent  Associations    logical connections, tangential connections  Insight    Fair  Judgment    Fair  Orientation    oriented to person, place, time, and general circumstances  Memory    recent and remote memory intact  Attention/Concentration    impaired  Morbid ideation No  Suicide Assessment    no suicidal ideation    History:    Social History:   Social History     Socioeconomic History    Marital status:      Spouse name: Not on file    Number of children: Not on file    Years of education: Not on file    Highest education level: Not on file   Occupational History    Occupation: customer service     Employer: [employer]   Social Recensus    Financial resource strain: Not on file    Food insecurity     Worry: Not on file     Inability: Not on file    Transportation needs     Medical: Not on file     Non-medical: Not on file   Tobacco Use    Smoking status: Former Smoker     Packs/day: 0.50     Years: 20.00     Pack years: 10.00     Start date: 9/10/2018     Last attempt to quit: 9/10/2018     Years since quittin.0    Smokeless tobacco: Never Used   Substance and Sexual Activity    Alcohol use:  Yes     Alcohol/week: 3.0 standard drinks     Types: 3 Glasses of wine per week     Comment: wine    Drug use: No    Sexual activity: Not on file   Lifestyle    Physical activity     Days per week: Not on file     Minutes per session: Not on file    Stress: Not on file   Relationships    Social connections     Talks on phone: Not on file     Gets together: Not on file     Attends [employer] service: Not on file     Active member of club or organization: Not on file     Attends meetings of clubs or organizations: Not on file     Relationship status: Not on file    Intimate partner violence     Fear of current or ex partner: Not on file     Emotionally abused: Not on file     Physically abused: Not on file     Forced sexual activity: Not on file   Other Topics Concern    Not on file   Social History Narrative    Not on file     TOBACCO:   reports that she quit smoking about 2 years ago. She started smoking about 2 years ago. She has a 10.00 pack-year smoking history. She has never used smokeless tobacco.  ETOH:   reports current alcohol use of about 3.0 standard drinks of alcohol per week. Diagnosis:  Adjustment disorder with mixed anxiety and depressed mood    Plan:  Pt interventions:  Established rapport, Conducted functional assessment, [place]-setting to identify pt's primary goals for [facility] visit / overall health and Supportive techniques    Documentation was done using voice recognition dragon software. Every effort was made to ensure accuracy; however, inadvertent, unintentional computerized transcription errors may be present.

## 2020-09-29 ENCOUNTER — OFFICE VISIT (OUTPATIENT)
Dept: INTERNAL MEDICINE CLINIC | Age: 60
End: 2020-09-29
Payer: COMMERCIAL

## 2020-09-29 VITALS
TEMPERATURE: 97.3 F | HEART RATE: 76 BPM | BODY MASS INDEX: 22.7 KG/M2 | DIASTOLIC BLOOD PRESSURE: 68 MMHG | WEIGHT: 158.2 LBS | SYSTOLIC BLOOD PRESSURE: 114 MMHG | OXYGEN SATURATION: 98 %

## 2020-09-29 DIAGNOSIS — E53.8 LOW FOLIC ACID: ICD-10-CM

## 2020-09-29 PROBLEM — M70.61 TROCHANTERIC BURSITIS OF RIGHT HIP: Status: ACTIVE | Noted: 2020-09-29

## 2020-09-29 PROCEDURE — 99214 OFFICE O/P EST MOD 30 MIN: CPT | Performed by: INTERNAL MEDICINE

## 2020-09-29 RX ORDER — MAGNESIUM 200 MG
1000 TABLET ORAL DAILY
Qty: 90 TABLET | Refills: 3 | Status: SHIPPED
Start: 2020-09-29 | End: 2021-03-25 | Stop reason: RX

## 2020-09-29 RX ORDER — FOLIC ACID 1 MG/1
1 TABLET ORAL DAILY
Qty: 90 TABLET | Refills: 3 | Status: SHIPPED | OUTPATIENT
Start: 2020-09-29 | End: 2021-09-21

## 2020-09-29 ASSESSMENT — ENCOUNTER SYMPTOMS
SHORTNESS OF BREATH: 0
CHEST TIGHTNESS: 0
CONSTIPATION: 0
DIARRHEA: 0

## 2020-09-29 NOTE — PROGRESS NOTES
Patient: Jason Godinez is a 64 y.o. female who presents today with the following Chief Complaint(s):  Chief Complaint   Patient presents with    Follow-up       HPI     Here today for f/u on cognitive issues. Brother, Nia Ferrera, is remotely present for the office visit via patient's cell phone with patient's permission. Did see Dr. Felicitas Oliveors yesterday. Has a f/u with her on October 9. Will see Dr. Elsa Rendon on 10/16. Has neurology appointment with Dr. Lizeth Laguna for neurology on 10/1/2020. Since starting Prozac 10 mg qd, states that she feels \"fine\". Brother does notice that she is more joyful, sharper since starting Prozac 10 mg. Is exercising daily. No side effects with Prozac. Has not been getting lost while driving, no misplacing items, no further difficulties with work. Feels like she has been more productive. Is c/o some pain in her right hip. Hurts to sleep on it at night. Does sit for work. Has been walking or riding a stationary bike more as well. Reviewed labs with patient:  CT Head 9/6/2020  FINDINGS:    BRAIN/VENTRICLES: The ventricles are normal in size. The sulci are normally    formed over the convexities bilaterally. No extra-axial fluid collections. No sign of recent intracranial hemorrhage.  Brain parenchymal attenuation is    normal. No mass lesions on this noncontrast study.         ORBITS: The visualized portion of the orbits demonstrate no acute abnormality.         SINUSES: The visualized paranasal sinuses and mastoid air cells demonstrate    no acute abnormality.         SOFT TISSUES/SKULL:  No acute abnormality of the visualized skull or soft    tissues.              Impression    No acute intracranial abnormality.         Vitamin B-12  418  211 - 911 pg/mL  Final  09/01/2020 11:19 AM  [facility]    Folate  5.05  4.78 - 24.20 ng/mL  Final  09/01/2020 11:19 AM  [facility]      TSH  1.09  0.27 - 4.20 uIU/mL  Final  09/01/2020 11:19 AM  [facility]      Lab Results   Component Value Date     09/01/2020    K 5.4 (H) 09/01/2020     09/01/2020    CO2 27 09/01/2020    BUN 9 09/01/2020    CREATININE 0.8 09/01/2020    GLUCOSE 88 09/01/2020    CALCIUM 10.1 09/01/2020    PROT 7.1 09/01/2020    LABALBU 4.5 09/01/2020    BILITOT 0.4 09/01/2020    ALKPHOS 55 09/01/2020    AST 15 09/01/2020    ALT 8 (L) 09/01/2020    LABGLOM >60 09/01/2020    GFRAA >60 09/01/2020    AGRATIO 1.7 09/01/2020    GLOB 2.6 09/01/2020       Lab Results   Component Value Date    WBC 5.6 09/01/2020    HGB 14.1 09/01/2020    HCT 42.8 09/01/2020    MCV 93.6 09/01/2020     09/01/2020     Lab Results   Component Value Date    CHOL 170 09/01/2020    CHOL 151 09/10/2018     Lab Results   Component Value Date    TRIG 60 09/01/2020    TRIG 65 09/10/2018     Lab Results   Component Value Date    HDL 61 (H) 09/01/2020    HDL 53 09/10/2018     Lab Results   Component Value Date    LDLCALC 97 09/01/2020    LDLCALC 85 09/10/2018     Lab Results   Component Value Date    LABVLDL 12 09/01/2020    LABVLDL 13 09/10/2018     No results found for: CHOLHDLRATIO  Vit D, 25-Hydroxy  22.4Low    >=30 ng/mL  Final  09/01/2020 11:19 AM  [facility]      Total Syphillis IgG/IgM  Non-Reactive  Non-reactive  Final  09/01/2020 11:19 AM  [facility]          Allergies   Allergen Reactions    Gluten Meal Diarrhea      Past Medical History:   Diagnosis Date    Celiac disease/sprue       Past Surgical History:   Procedure Laterality Date    COLONOSCOPY  10/04/2018    MANDIBLE RECONSTRUCTION      TX COLONOSCOPY FLX DX W/COLLJ SPEC WHEN PFRMD N/A 10/4/2018    COLONOSCOPY DIAGNOSTIC OR SCREENING performed by Talia Chirinos MD at [address] History     Socioeconomic History    Marital status:      Spouse name: Not on file    Number of children: Not on file    Years of education: Not on file    Highest education level: Not on file   Occupational History  Occupation: customer service     Employer: [employer] resource strain: Not on file    Food insecurity     Worry: Not on file     Inability: Not on file    Transportation needs     Medical: Not on file     Non-medical: Not on file   Tobacco Use    Smoking status: Former Smoker     Packs/day: 0.50     Years: 20.00     Pack years: 10.00     Start date: 9/10/2018     Last attempt to quit: 9/10/2018     Years since quittin.0    Smokeless tobacco: Never Used   Substance and Sexual Activity    Alcohol use: Yes     Alcohol/week: 3.0 standard drinks     Types: 3 Glasses of wine per week     Comment: wine    Drug use: No    Sexual activity: Not on file   Lifestyle    Physical activity     Days per week: Not on file     Minutes per session: Not on file    Stress: Not on file   Relationships    Social connections     Talks on phone: Not on file     Gets together: Not on file     Attends Restorationist service: Not on file     Active member of club or organization: Not on file     Attends meetings of clubs or organizations: Not on file     Relationship status: Not on file    Intimate partner violence     Fear of current or ex partner: Not on file     Emotionally abused: Not on file     Physically abused: Not on file     Forced sexual activity: Not on file   Other Topics Concern    Not on file   Social History Narrative    Not on file     Family History   Problem Relation Age of Onset    High Blood Pressure Father     Diabetes Father     Alzheimer's Disease Mother         Outpatient Medications Prior to Visit   Medication Sig Dispense Refill    FLUoxetine (PROZAC) 10 MG capsule Take 1 capsule by mouth daily 30 capsule 3     No facility-administered medications prior to visit. Patient'spast medical history, surgical history, family history, medications,  and allergies  were all reviewed and updated as appropriate today.     Review of Systems   Constitutional: Negative for appetite change, fatigue and fever. Respiratory: Negative for chest tightness and shortness of breath. Cardiovascular: Negative for chest pain. Gastrointestinal: Negative for constipation and diarrhea. Skin: Negative for rash. Neurological: Negative for speech difficulty and headaches. /68   Pulse 76   Temp 97.3 °F (36.3 °C)   Wt 158 lb 3.2 oz (71.8 kg)   SpO2 98%   BMI 22.70 kg/m²   Physical Exam  Vitals signs and nursing note reviewed. Constitutional:       Appearance: She is well-developed. She is not toxic-appearing. HENT:      Head: Normocephalic. Right Ear: Tympanic membrane, ear canal and external ear normal.      Left Ear: Tympanic membrane, ear canal and external ear normal.   Eyes:      General: No scleral icterus. Extraocular Movements: Extraocular movements intact. Conjunctiva/sclera: Conjunctivae normal.      Pupils: Pupils are equal, round, and reactive to light. Neck:      Thyroid: No thyroid mass or thyromegaly. Vascular: No carotid bruit. Cardiovascular:      Rate and Rhythm: Normal rate and regular rhythm. Pulses:           Dorsalis pedis pulses are 2+ on the right side and 2+ on the left side. Heart sounds: Normal heart sounds. No murmur. Comments: No LE edema  Pulmonary:      Effort: Pulmonary effort is normal.      Breath sounds: Normal breath sounds. Musculoskeletal:      Right hip: She exhibits tenderness (over trochanteric bursae). She exhibits normal range of motion, no swelling, no crepitus and no deformity. Lymphadenopathy:      Cervical: No cervical adenopathy. Neurological:      Mental Status: She is alert. Psychiatric:         Mood and Affect: Mood normal.         Behavior: Behavior normal. Behavior is cooperative. ASSESSMENT/PLAN:    Problem List Items Addressed This Visit     Celiac sprue     Folic acid are in the low normal range. Add supplementation.          Relevant Medications    Cyanocobalamin (B-12) 2400 MCG SUBL    folic acid (FOLVITE) 1 MG tablet    Cognitive impairment - Primary     Reviewed findings of CT of head which was actually normal.  Reviewed blood work which was unremarkable although X77 and folic acid were in the low-normal range. She had her first visit with Dr. Magdalena Higgins yesterday and has a follow-up with her in about 2 weeks. She will also be establishing with Dr. Kami Goss in about 3 weeks. Continue Prozac 10 mg daily. Patient has noticed some improvement in her functioning since starting on Prozac, raising the question of pseudodementia. She will likely still require neuropsych testing and possible referral to neurology. Depressive disorder     Seems to be feeling better. Continue Prozac 10 mg daily. Does not feel as if it needs to be increased. She had her first visit with Dr. Magdalena Higgins yesterday and has a follow-up with her in about 2 weeks. She will also be establishing with Dr. Kami Goss in about 3 weeks. Patient has noticed some improvement in her functioning since starting on Prozac, raising the question of pseudodementia. Low folic acid     Check methylmalonic acid level. Add vitamin R62 and folic acid supplement. Suspect related to celiac sprue with poor absorption. Relevant Orders    METHYLMALONIC ACID, SERUM (Completed)    Trochanteric bursitis of right hip     Stretches given. Recommend Aleve 2 p.o. twice daily for 10 to 14 days to help improve hip pain. Current Outpatient Medications   Medication Sig Dispense Refill    Cyanocobalamin (B-12) 1000 MCG SUBL Place 1,000 mcg under the tongue daily 90 tablet 3    folic acid (FOLVITE) 1 MG tablet Take 1 tablet by mouth daily 90 tablet 3    FLUoxetine (PROZAC) 10 MG capsule Take 1 capsule by mouth daily 30 capsule 3     No current facility-administered medications for this visit. Return in about 6 weeks (around 11/10/2020) for follow up.

## 2020-09-29 NOTE — PATIENT INSTRUCTIONS
For your hip pain you may take 2 Aleve twice a day as needed. Patient Education        Bursitis: Care Instructions  Your Care Instructions     A bursa is a small sac of fluid that helps the tissues around a joint slide over one another easily. Injury or overuse of a joint can cause pain, redness, and inflammation in the bursa (bursitis). Bursitis usually gets better if you avoid the activity that caused it. You can help prevent bursitis from coming back by doing stretching and strengthening exercises. You may also need to change the way you do some activities. Follow-up care is a key part of your treatment and safety. Be sure to make and go to all appointments, and call your doctor if you are having problems. It's also a good idea to know your test results and keep a list of the medicines you take. How can you care for yourself at home? · Put ice or a cold pack on the area for 10 to 20 minutes at a time. Try to do this every 1 to 2 hours for the next 3 days (when you are awake) or until the swelling goes down. Put a thin cloth between the ice and your skin. · After the 3 days of using ice, you may use heat on the area. You can use a hot water bottle; a warm, moist towel; or a heating pad set on low. You can also try alternating heat and ice. · Rest the area where you have pain. Stop any activities that cause pain. Switch to activities that do not stress the area. · Take pain medicines exactly as directed. ? If the doctor gave you a prescription medicine for pain, take it as prescribed. ? If you are not taking a prescription pain medicine, ask your doctor if you can take an over-the-counter medicine. ? Do not take two or more pain medicines at the same time unless the doctor told you to. Many pain medicines have acetaminophen, which is Tylenol. Too much acetaminophen (Tylenol) can be harmful. · To prevent stiffness, gently move the joint as much as you can without pain every day.  As the pain gets better, near your knee. 3. Use your hand to gently push the knee of your affected leg away from your body until you feel a gentle stretch around your hip. 4. Hold the stretch for 15 to 30 seconds. 5. Repeat 2 to 4 times. 6. Repeat steps 1 through 5, but this time use your hand to gently pull your knee toward your opposite shoulder. 7. Switch legs and repeat steps 1 through 6, even if only one hip is sore. Seated hip rotator stretch   1. Sit in a sturdy chair. 2. Cross your affected leg over your knee, resting your foot on top of your knee. 3. Keep your back straight, and slowly lean forward until you feel a stretch in your hip. 4. Hold for 15 to 30 seconds. 5. Switch legs and repeat steps 1 through 4 on your other side. 6. Repeat 2 to 4 times. Hamstring stretch (lying down)   1. Lie flat on your back with your legs straight. If you feel discomfort in your back, place a small towel roll under your lower back. 2. Holding the back of your affected leg, lift your leg straight up and toward your body until you feel a stretch at the back of your thigh. 3. Hold the stretch for at least 30 seconds. 4. Repeat 2 to 4 times. 5. Switch legs and repeat steps 1 through 4, even if only one hip is sore. Bridging   1. Lie on your back with both knees bent. Your knees should be bent about 90 degrees. 2. Then push your feet into the floor, squeeze your buttocks, and lift your hips off the floor until your shoulders, hips, and knees are all in a straight line. 3. Hold for about 6 seconds as you continue to breathe normally, and then slowly lower your hips back down to the floor and rest for up to 10 seconds. 4. Repeat 8 to 12 times. Single-leg bridge   1. Lie on your back, with your arms at your sides. 2. Bend one knee, and keep that foot flat on the floor. The other leg should be straight. 3. Raise the straight leg up so that the knee is level with the bent knee.   4. Tighten your belly muscles by pulling your belly button in toward your spine. Lift your buttocks up and be careful not to let your hips drop down. 5. Hold for about 6 seconds as you continue to breathe normally, and then slowly lower your hips back down to the floor. 6. Switch legs and repeat steps 1 though 5.  7. Repeat 8 to 12 times. Follow-up care is a key part of your treatment and safety. Be sure to make and go to all appointments, and call your doctor if you are having problems. It's also a good idea to know your test results and keep a list of the medicines you take. Where can you learn more? Go to https://MobSoc Mediapepiceweb.Connected Data. org and sign in to your [employer] account. Enter X559 in the Coub box to learn more about \"Gluteal Strain: Rehab Exercises. \"     If you do not have an account, please click on the \"Sign Up Now\" link. Current as of: March 2, 2020               Content Version: 12.5  © 2006-2020 Healthwise, [employer]. Care instructions adapted under license by [facility] ([facility]). If you have questions about a medical condition or this instruction, always ask your healthcare professional. [place] [address] any warranty or liability for your use of this information. Patient Education        Hip Flexor Strain: Rehab Exercises  Introduction  Here are some examples of exercises for you to try. The exercises may be suggested for a condition or for rehabilitation. Start each exercise slowly. Ease off the exercises if you start to have pain. You will be told when to start these exercises and which ones will work best for you. How to do the exercises  Pelvic tilt with marching   8. Lie on your back with your knees bent and your feet flat on the floor. 9. Tighten your belly muscles and buttocks, and press your lower back to the floor. 10. Keeping your knees bent, lift and then lower one leg up off the floor, and then lift and lower your other leg like you are marching.  Each time you lift your leg, hold that position for about 6 seconds before lowering your leg. 11. Repeat 8 to 12 times. Scissors   7. Lie on your back with your knees bent at a 90-degree angle and your feet off the floor. 8. Tighten your belly muscles and buttocks, and press your lower back to the floor. 9. Slowly straighten one leg, and hold that position for about 6 seconds. Your leg should be about 12 inches off the floor. Bring that leg back to the starting position, and then straighten your other leg. Hold that position for about 6 seconds, and then switch legs again. 10. Repeat 8 to 12 times. Hamstring stretch (lying down)   6. Lie flat on your back with your legs straight. If you feel discomfort in your back, place a small towel roll under your lower back. 7. Holding the back of your affected leg for support, lift your leg straight up and toward your body until you feel a stretch at the back of your thigh. 8. Hold the stretch for at least 30 seconds. 9. Repeat 2 to 4 times. Quadricep and hip flexor stretch (lying on side)   5. Lie on your side with your good leg flat on the floor and your hand supporting your head. 6. Bend your top leg, and reach behind you to grab the front of that foot or ankle with your other hand. 7. Stretch your leg back by pulling your foot toward your buttock. You will feel the stretch in the front of your thigh. If this causes stress on your knee, do not do this stretch. 8. Hold the stretch for at least 15 to 30 seconds. 9. Repeat 2 to 4 times. Hip flexor stretch (kneeling)   8. Kneel on your affected leg and bend your good leg out in front of you, with that foot flat on the floor. If you feel discomfort in the front of your knee, place a towel under your knee. 9. Keeping your back straight, slowly push your hips forward until you feel a stretch in the upper thigh of your back leg and hip. 10. Hold the stretch for at least 15 to 30 seconds. 11. Repeat 2 to 4 times.     Hip flexor stretch (edge of table)   1. Lie flat on your back on a table or flat bench, with your knees and lower legs hanging off the edge of the table. 2. Grab your good leg at the knee, and pull that knee back toward your chest. Relax your affected leg and let it hang down toward the floor until you feel a stretch in the upper thigh of your affected leg and hip. 3. Hold the stretch for at least 15 to 30 seconds. 4. Repeat 2 to 4 times. Follow-up care is a key part of your treatment and safety. Be sure to make and go to all appointments, and call your doctor if you are having problems. It's also a good idea to know your test results and keep a list of the medicines you take. Where can you learn more? Go to https://HD Fantasy Football.CloudOn. org and sign in to your [employer] account. Enter S640 in the Macrocosm box to learn more about \"Hip Flexor Strain: Rehab Exercises. \"     If you do not have an account, please click on the \"Sign Up Now\" link. Current as of: March 2, 2020               Content Version: 12.5  © 0869-8207 Healthwise, Incorporated. Care instructions adapted under license by [facility] ([facility]). If you have questions about a medical condition or this instruction, always ask your healthcare professional. [address] any warranty or liability for your use of this information.

## 2020-10-02 LAB — METHYLMALONIC ACID: 0.22 UMOL/L (ref 0–0.4)

## 2020-10-04 NOTE — ASSESSMENT & PLAN NOTE
Check methylmalonic acid level. Add vitamin N61 and folic acid supplement. Suspect related to celiac sprue with poor absorption.

## 2020-10-04 NOTE — ASSESSMENT & PLAN NOTE
Seems to be feeling better. Continue Prozac 10 mg daily. Does not feel as if it needs to be increased. She had her first visit with Dr. Jaylin Younger yesterday and has a follow-up with her in about 2 weeks. She will also be establishing with Dr. Wilton Weston in about 3 weeks. Patient has noticed some improvement in her functioning since starting on Prozac, raising the question of pseudodementia.

## 2020-10-06 ENCOUNTER — TELEPHONE (OUTPATIENT)
Dept: INTERNAL MEDICINE CLINIC | Age: 60
End: 2020-10-06

## 2020-10-06 NOTE — TELEPHONE ENCOUNTER
Patient calling back for Nae. She works 2nd shift. She states tomorrow around 11 am would be a good call back time.

## 2020-10-09 ENCOUNTER — OFFICE VISIT (OUTPATIENT)
Dept: PSYCHOLOGY | Age: 60
End: 2020-10-09

## 2020-10-09 NOTE — PROGRESS NOTES
Tobacco Use    Smoking status: Former Smoker     Packs/day: 0.50     Years: 20.00     Pack years: 10.00     Start date: 9/10/2018     Last attempt to quit: 9/10/2018     Years since quittin.0    Smokeless tobacco: Never Used   Substance and Sexual Activity    Alcohol use: Yes     Alcohol/week: 3.0 standard drinks     Types: 3 Glasses of wine per week     Comment: wine    Drug use: No    Sexual activity: Not on file   Lifestyle    Physical activity     Days per week: Not on file     Minutes per session: Not on file    Stress: Not on file   Relationships    Social connections     Talks on phone: Not on file     Gets together: Not on file     Attends Yarsani service: Not on file     Active member of club or organization: Not on file     Attends meetings of clubs or organizations: Not on file     Relationship status: Not on file    Intimate partner violence     Fear of current or ex partner: Not on file     Emotionally abused: Not on file     Physically abused: Not on file     Forced sexual activity: Not on file   Other Topics Concern    Not on file   Social History Narrative    Not on file     TOBACCO:   reports that she quit smoking about 2 years ago. She started smoking about 2 years ago. She has a 10.00 pack-year smoking history. She has never used smokeless tobacco.  ETOH:   reports current alcohol use of about 3.0 standard drinks of alcohol per week. Diagnosis:  Adjustment disorder w depressed mood and anxiety      Plan:  Pt interventions:  Collaboratively set goals with pt re: relapse prevention        Documentation was done using voice recognition dragon software. Every effort was made to ensure accuracy; however, inadvertent, unintentional computerized transcription errors may be present.

## 2020-10-13 ENCOUNTER — TELEPHONE (OUTPATIENT)
Dept: INTERNAL MEDICINE CLINIC | Age: 60
End: 2020-10-13

## 2020-10-13 NOTE — TELEPHONE ENCOUNTER
----- Message from Lexus Limdevin sent at 10/13/2020 10:55 AM EDT -----  Subject: Message to Provider    QUESTIONS  Information for Provider? Patient is returning a phone call to the office. Please leave a voicemail if she is unavailable.  ---------------------------------------------------------------------------  --------------  [address]  What is the best way for the office to contact you? OK to leave message on   voicemail  Preferred Call Back Phone Number? [phone]  ---------------------------------------------------------------------------  --------------  SCRIPT ANSWERS  Relationship to Patient?  Self

## 2020-10-16 ENCOUNTER — OFFICE VISIT (OUTPATIENT)
Dept: PSYCHIATRY | Age: 60
End: 2020-10-16
Payer: COMMERCIAL

## 2020-10-16 PROCEDURE — 99204 OFFICE O/P NEW MOD 45 MIN: CPT | Performed by: PSYCHIATRY & NEUROLOGY

## 2020-10-16 NOTE — PROGRESS NOTES
PSYCHIATRY INITIAL EVALUATION  TELEHEALTH EVALUATION    Nae Rosenbaum  1960  10/16/20  Total time: 45 min  Provider location: Office  Patient location: Home  PCP: Preston Damon DO    CC: New Patient      ASSESSMENT:   63 yo F who had a period of depression and cognitive impairment a couple months ago. With treatment with a low dose antidepressant she seems to have improved and is not noticing any cognitive or mood problems at this time and is generally functioning well. Could have been all related to a depressive episode, but also certainly would want to keep in mind the possibility of an underlying dementia, however I have low suspicion for this. Neuropsych testing could help establish this but probably is not necessary to pursue at this time. 1. Unspecified depressive disorder, in remission  2. Vitamin D deficiency    PLAN:   1. Continue fluoxetine 10mg daily  2. Continue vitamin B32 and folic acid supplementation and vitamin D supplementation  3. Continue routine follow up with Dr. Jeffery Proctor. Yearly MOCA testing would be beneficial to keep track of developing cognitive changes. A referral to a neuropsychologist can always be offered if she or family has additional concerns, however if depression is a factor she may need adjustments to her antidepressant first.     I discussed this with patient and she was agreeable to the plan as above with follow up PRN. Follow-up: RTC prn  Safety: Pt is low risk for future dangerousness to self or others    ____________________________________________________________________________    HPI:   Virtual  Visit was conducted, with patient's consent, Services were provided through a video synchronous discussion virtually to substitute for in-person clinic visit. Doxy. me used for the [employer]. context: Pt is a 63 yo F presenting as a referral from Dr. Jeffrey Proctor for concerns of depression and cognitive impairment.  She has a MOCA score of 22/30 in September and there were concerns of depression playing a role. She did see Dr. Jennifer Palacios a couple weeks later and noted depressed symptoms around the time of [address] pandemic, noted to be tangential, taking pauses before answer. The concern initial was prompted by family who saw her in July and noted she was irritable, confused by directions, more forgetful. Pt also did see a neurologist once who recommended an MRI but patient did not feel this was necessary so did not obtain this. associated symptoms:   Pt at this time denies feeling depressed. She is generally an introvert and enjoys spending time to herself and reading. She continues to read often and finds this enjoyable and denies any problems concentrating when she is reading. Her sleep is good, her energy is good, and she gets out to take a 15 minute walk typically once per day. Appetite is good and denies any major weight changes- maybe a little as she tends to eat more in the colder months. 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 She denies problems with her work and states it is going really well. She did have some issues with getting some directions mixed up a couple months ago but this was easily corrected. Denies issues with concentration or forgetfulness with her job. Around the time of the family reunion, pt was feeling more stressed, may have been more depressed. She has issues with things piling up at work and this was becoming a stress. Also with the pandemic and how it was impacting their plans on the trip, this was also a source of stress for her. She finds her family also overwhelming as they can be controlling and critical.    modifying factors:   Since being on fluoxetine, vitamin T30 and folic acid, and vitamin d, she is feeling overall better and sharper with her memory and denies there are any problems. She denies side effects with the treatment.      Timing: subacute onset   duration: 3-4 month. She did have an episode of depression about 20 yrs ago and saw a psychiatrist at that time - did not take medications  severity: moderate    Outside records: reviewed assessment from Dr. Alejandro Burgess and Dr. Marcia Griffiths. Collateral obtained from brother after starting fluoxetine indicated that she was more joyful and sharper since starting it. ROS:   GEN: no fevers or chills HEENT: no fevers or chills CV: no cp, no palpitations RESP: no dyspnea : no dysuria MSK: no muscle or joint pain GI: no n/v/d Skin: no rashes NEURO: no tremors ENDO: no weight changes    Past Psychiatric History:   Hosp: none previously  Diagnoses: depression  Med trials: fluoxetine  Outpt: saw a psychiatrist briefly about 20 yrs ago when she was going through marital stress  Suicide Attempts: denies    Past Medical History:   Diagnosis Date    Celiac disease/sprue      Past Surgical History:   Procedure Laterality Date    COLONOSCOPY  10/04/2018    MANDIBLE RECONSTRUCTION      AR COLONOSCOPY FLX DX W/COLLJ SPEC WHEN PFRMD N/A 10/4/2018    COLONOSCOPY DIAGNOSTIC OR SCREENING performed by Yvon Justin MD at [address] History:  Grew up in [place], [place] until 8th grade, then moved to [facility]. She is one of 1 siblings. (4 brothers, 2 sisters)  Living situation: generally has lived alone most of her adult life  Marital:  for 7 yrs previously. Children: 1 son who lives in [place], [place]. She has a good relationship with him. She has 2 grandchildren. Son is from a relationship she had prior to her marriage. Father of her son is not involved at all in his or her life. Edu: went to [employer] in [place] to study communications. Mother is in a skill nursing facility locally.      Substance abuse history:  Alcohol: 2-3 glasses of wine per week  Tobacco: denies  Illicits: denies    Family History   Problem Relation Age of Onset    High Blood Pressure Father     Diabetes Father    Rowena Keatingard Alzheimer's Disease Mother     Bipolar Disorder Brother      Allergies   Allergen Reactions    Gluten Meal Diarrhea     Current Outpatient Medications on File Prior to Visit   Medication Sig Dispense Refill    Cyanocobalamin (B-12) 1000 MCG SUBL Place 1,000 mcg under the tongue daily 90 tablet 3    folic acid (FOLVITE) 1 MG tablet Take 1 tablet by mouth daily 90 tablet 3    FLUoxetine (PROZAC) 10 MG capsule Take 1 capsule by mouth daily 30 capsule 3     No current facility-administered medications on file prior to visit. OBJECTIVE:  . There were no vitals filed for this visit. MSE:   Appearance    Casually dressed, well groomed  Motor: No abnormal movements, tics or mannerisms. Speech   Normal rate, rhythm and vol  Language   No aphasia  Mood/Affect   good / shows normal motility and full range  Thought Process    Linear, logical, goal oriented  Thought Content    No delusions, future oriented , no SI/HI  Associations   Linear  Attention/Concentration   Intact. Serial 7s were normal, spelled WORLD backwards without issues  Orientation    AxOx4  Memory   Grossly intact to recent and remote content. Delayed recall was 2/3.    Fund of Knowledge    intact  Insight/Judgement   fair / good    Labs:     Lab Results   Component Value Date    CHOL 170 09/01/2020    CHOL 151 09/10/2018     Lab Results   Component Value Date    TRIG 60 09/01/2020    TRIG 65 09/10/2018     Lab Results   Component Value Date    HDL 61 (H) 09/01/2020    HDL 53 09/10/2018     Lab Results   Component Value Date    LDLCALC 97 09/01/2020    LDLCALC 85 09/10/2018     Lab Results   Component Value Date    LABVLDL 12 09/01/2020    LABVLDL 13 09/10/2018     No results found for: CHOLHDLRATIO    No results found for: LABA1C  No results found for: EAG    TSH 9/1/2020: 1.09    Lab Results   Component Value Date    CSMRUFOS68 418 09/01/2020     Lab Results   Component Value Date    FOLATE 5.05 09/01/2020       Imaging:   CT Head 9/6/2020:

## 2020-11-10 ENCOUNTER — OFFICE VISIT (OUTPATIENT)
Dept: INTERNAL MEDICINE CLINIC | Age: 60
End: 2020-11-10
Payer: COMMERCIAL

## 2020-11-10 VITALS
WEIGHT: 160.2 LBS | SYSTOLIC BLOOD PRESSURE: 108 MMHG | HEART RATE: 52 BPM | BODY MASS INDEX: 22.99 KG/M2 | DIASTOLIC BLOOD PRESSURE: 64 MMHG | TEMPERATURE: 98.4 F | OXYGEN SATURATION: 98 %

## 2020-11-10 PROCEDURE — 99213 OFFICE O/P EST LOW 20 MIN: CPT | Performed by: INTERNAL MEDICINE

## 2020-11-10 RX ORDER — FLUOXETINE 10 MG/1
10 CAPSULE ORAL DAILY
Qty: 90 CAPSULE | Refills: 3 | Status: SHIPPED | OUTPATIENT
Start: 2020-11-10 | End: 2021-09-27 | Stop reason: SDUPTHER

## 2020-11-10 NOTE — PROGRESS NOTES
Comment: wine    Drug use: No    Sexual activity: Not on file   Lifestyle    Physical activity     Days per week: Not on file     Minutes per session: Not on file    Stress: Not on file   Relationships    Social connections     Talks on phone: Not on file     Gets together: Not on file     Attends Worship service: Not on file     Active member of club or organization: Not on file     Attends meetings of clubs or organizations: Not on file     Relationship status: Not on file    Intimate partner violence     Fear of current or ex partner: Not on file     Emotionally abused: Not on file     Physically abused: Not on file     Forced sexual activity: Not on file   Other Topics Concern    Not on file   Social History Narrative    Not on file     Family History   Problem Relation Age of Onset    High Blood Pressure Father     Diabetes Father     Alzheimer's Disease Mother     Bipolar Disorder Brother         Outpatient Medications Prior to Visit   Medication Sig Dispense Refill    Cyanocobalamin (B-12) 1000 MCG SUBL Place 1,000 mcg under the tongue daily 90 tablet 3    folic acid (FOLVITE) 1 MG tablet Take 1 tablet by mouth daily 90 tablet 3    FLUoxetine (PROZAC) 10 MG capsule Take 1 capsule by mouth daily 30 capsule 3     No facility-administered medications prior to visit. Patient'spast medical history, surgical history, family history, medications,  and allergies  were all reviewed and updated as appropriate today. Review of Systems   Constitutional: Negative for activity change and appetite change. Psychiatric/Behavioral: Negative for agitation, behavioral problems, dysphoric mood and sleep disturbance. The patient is not nervous/anxious. /64   Pulse 52   Temp 98.4 °F (36.9 °C)   Wt 160 lb 3.2 oz (72.7 kg)   SpO2 98%   BMI 22.99 kg/m²   Physical Exam  Constitutional:       Appearance: Normal appearance. She is normal weight.    Neurological:      General: No focal deficit present. Mental Status: She is alert and oriented to person, place, and time. Psychiatric:         Mood and Affect: Mood normal.         Behavior: Behavior normal.         Thought Content: Thought content normal.         Judgment: Judgment normal.         ASSESSMENT/PLAN:    Problem List Items Addressed This Visit     Cognitive impairment     Improved with treating depression. Patient did see Dr. Geoff Rivera who felt that her cognitive issues may be related to her depression but does recommend close follow-up of cognition. May still ultimately need to see neuropsych. We will request records from Dr. Gennette Landau. Recommend that she reach out to Dr. Gennette Landau and inquire how important the MRI is with regards to her work-up, particularly since she is improving. Advised that it is likely not urgent and can wait until January so that it may benefit her from a deductible standpoint. We will repeat [facility] next fall. Continue Prozac 10 mg daily. Depressive disorder - Primary     Much improved. Continue Prozac 10 mg daily. Relevant Medications    FLUoxetine (PROZAC) 10 MG capsule          Current Outpatient Medications   Medication Sig Dispense Refill    FLUoxetine (PROZAC) 10 MG capsule Take 1 capsule by mouth daily 90 capsule 3    Cyanocobalamin (B-12) 1000 MCG SUBL Place 1,000 mcg under the tongue daily 90 tablet 3    folic acid (FOLVITE) 1 MG tablet Take 1 tablet by mouth daily 90 tablet 3     No current facility-administered medications for this visit. Return in about 3 months (around 2/10/2021). 15 minutes were spent with patient today in face to face encounter, more than 50% of it was counseling regarding depression/cognitive concerns/specialist visits/work-up. Cornel Gimenez

## 2021-02-10 ENCOUNTER — VIRTUAL VISIT (OUTPATIENT)
Dept: INTERNAL MEDICINE CLINIC | Age: 61
End: 2021-02-10
Payer: COMMERCIAL

## 2021-02-10 DIAGNOSIS — F32.A DEPRESSIVE DISORDER: Primary | ICD-10-CM

## 2021-02-10 DIAGNOSIS — K90.0 CELIAC SPRUE: ICD-10-CM

## 2021-02-10 DIAGNOSIS — E55.9 VITAMIN D INSUFFICIENCY: ICD-10-CM

## 2021-02-10 DIAGNOSIS — Z00.00 WELL ADULT EXAM: ICD-10-CM

## 2021-02-10 DIAGNOSIS — R41.89 COGNITIVE IMPAIRMENT: ICD-10-CM

## 2021-02-10 PROCEDURE — 99213 OFFICE O/P EST LOW 20 MIN: CPT | Performed by: INTERNAL MEDICINE

## 2021-02-10 NOTE — PROGRESS NOTES
Nae Rosenbaum (:  1960) is a 63 y.o. female,Established patient, here for evaluation of the following chief complaint(s): 3 Month Follow-Up      ASSESSMENT/PLAN:  1. Depressive disorder  Assessment & Plan:  Significantly improved. Continue Prozac 10 mg daily. 2. Cognitive impairment  Assessment & Plan:  Has resolved with treatment of depression. Check SLUMS in 6 months. 3. Vitamin D insufficiency  -     Vitamin D 25 Hydroxy; Future  4. Celiac sprue  -     Vitamin B12 & Folate; Future  5. Well adult exam  -     CBC Auto Differential; Future  -     Comprehensive Metabolic Panel; Future  -     Lipid Panel; Future  -     TSH with Reflex; Future      Return in about 6 months (around 8/10/2021) for labs prior; wellness with SLUMS. SUBJECTIVE/OBJECTIVE:  HPI     Unable to connect via Doxy.me (? Issues with the platform). Is feeling really well. States that she has been feeling much more alert, sharper, and happy. Remains on Prozac 10 mg qd, B-12 8004 mcg qd, and folic acid 1 mg qd. No further issues with her memory. No side effects with Prozac. Is enjoying time with her grandchildren and her son over the phone. Is hoping to go and visit them once she has been vaccinated for COVID. Is being very careful with COVID- working from home and only going to the grocery store. Is planning to get her COVID vaccine when she is eligible. Still eating gluten free. Is doing a lot of her own cooking. Review of Systems    No flowsheet data found. Physical Exam     A&O x 3  Mood, affect normal.   No conversational dyspnea      On this date 21 I have spent 15 minutes reviewing previous notes, test results and face to face (virtual) with the patient discussing the diagnosis and importance of compliance with the treatment plan as well as documenting on the day of the visit. Rosmery Goldstein is a 63 y.o. female being evaluated by a Virtual Visit (video visit) encounter to address concerns as mentioned above. A caregiver was present when appropriate. Due to this being a TeleHealth encounter (During [facility] public health emergency), evaluation of the following organ systems was limited: Vitals/Constitutional/EENT/Resp/CV/GI//MS/Neuro/Skin/Heme-Lymph-Imm. Pursuant to the emergency declaration under the [address] [place] [address] and the [employer] Act, this Virtual Visit was conducted with patient's (and/or legal guardian's) consent, to reduce the patient's risk of exposure to COVID-19 and provide necessary medical care. The patient (and/or legal guardian) has also been advised to contact this office for worsening conditions or problems, and seek emergency medical treatment and/or call 911 if deemed necessary. Patient identification was verified at the start of the visit: Yes    Services were provided through a video synchronous discussion virtually to substitute for in-person clinic visit. Patient was located at home and provider was located in office or at home.      An electronic signature was used to authenticate this note.    --Lizeth Mendez, DO

## 2021-03-25 ENCOUNTER — TELEPHONE (OUTPATIENT)
Dept: INTERNAL MEDICINE CLINIC | Age: 61
End: 2021-03-25

## 2021-03-25 DIAGNOSIS — K90.0 CELIAC SPRUE: ICD-10-CM

## 2021-03-30 ENCOUNTER — IMMUNIZATION (OUTPATIENT)
Dept: PRIMARY CARE CLINIC | Age: 61
End: 2021-03-30
Payer: COMMERCIAL

## 2021-03-30 PROCEDURE — 91301 COVID-19, MODERNA VACCINE 100MCG/0.5ML DOSE: CPT | Performed by: FAMILY MEDICINE

## 2021-03-30 PROCEDURE — 0011A COVID-19, MODERNA VACCINE 100MCG/0.5ML DOSE: CPT | Performed by: FAMILY MEDICINE

## 2021-04-27 ENCOUNTER — IMMUNIZATION (OUTPATIENT)
Dept: PRIMARY CARE CLINIC | Age: 61
End: 2021-04-27
Payer: OTHER GOVERNMENT

## 2021-04-27 PROCEDURE — 0012A COVID-19, MODERNA VACCINE 100MCG/0.5ML DOSE: CPT | Performed by: FAMILY MEDICINE

## 2021-04-27 PROCEDURE — 91301 COVID-19, MODERNA VACCINE 100MCG/0.5ML DOSE: CPT | Performed by: FAMILY MEDICINE

## 2021-08-02 ENCOUNTER — TELEPHONE (OUTPATIENT)
Dept: INTERNAL MEDICINE CLINIC | Age: 61
End: 2021-08-02

## 2021-08-02 NOTE — TELEPHONE ENCOUNTER
----- Message from Ynes Palacio sent at 8/2/2021 12:29 PM EDT -----  Subject: Message to Provider    QUESTIONS  Information for Provider? Need to know if she needs to fast for physical   appt. Needs blood work done also.  ---------------------------------------------------------------------------  --------------  [address]  What is the best way for the office to contact you? OK to leave message on   voicemail  Preferred Call Back Phone Number? [phone]  ---------------------------------------------------------------------------  --------------  SCRIPT ANSWERS  Relationship to Patient?  Self

## 2021-09-18 DIAGNOSIS — K90.0 CELIAC SPRUE: ICD-10-CM

## 2021-09-21 RX ORDER — FOLIC ACID 1 MG/1
TABLET ORAL
Qty: 90 TABLET | Refills: 3 | Status: SHIPPED | OUTPATIENT
Start: 2021-09-21 | End: 2021-09-27 | Stop reason: SDUPTHER

## 2021-09-27 ENCOUNTER — OFFICE VISIT (OUTPATIENT)
Dept: INTERNAL MEDICINE CLINIC | Age: 61
End: 2021-09-27
Payer: COMMERCIAL

## 2021-09-27 VITALS
HEART RATE: 60 BPM | SYSTOLIC BLOOD PRESSURE: 100 MMHG | WEIGHT: 155.6 LBS | DIASTOLIC BLOOD PRESSURE: 60 MMHG | BODY MASS INDEX: 22.33 KG/M2 | OXYGEN SATURATION: 99 %

## 2021-09-27 DIAGNOSIS — E55.9 VITAMIN D INSUFFICIENCY: ICD-10-CM

## 2021-09-27 DIAGNOSIS — Z00.00 WELL ADULT EXAM: Primary | ICD-10-CM

## 2021-09-27 DIAGNOSIS — F32.A DEPRESSIVE DISORDER: ICD-10-CM

## 2021-09-27 DIAGNOSIS — Z12.31 ENCOUNTER FOR SCREENING MAMMOGRAM FOR MALIGNANT NEOPLASM OF BREAST: ICD-10-CM

## 2021-09-27 DIAGNOSIS — K90.0 CELIAC SPRUE: ICD-10-CM

## 2021-09-27 PROCEDURE — 90674 CCIIV4 VAC NO PRSV 0.5 ML IM: CPT | Performed by: INTERNAL MEDICINE

## 2021-09-27 PROCEDURE — 90471 IMMUNIZATION ADMIN: CPT | Performed by: INTERNAL MEDICINE

## 2021-09-27 PROCEDURE — 99396 PREV VISIT EST AGE 40-64: CPT | Performed by: INTERNAL MEDICINE

## 2021-09-27 RX ORDER — FOLIC ACID 1 MG/1
1 TABLET ORAL DAILY
Qty: 90 TABLET | Refills: 3 | Status: SHIPPED | OUTPATIENT
Start: 2021-09-27 | End: 2022-03-28 | Stop reason: SDUPTHER

## 2021-09-27 RX ORDER — FLUOXETINE 10 MG/1
10 CAPSULE ORAL DAILY
Qty: 90 CAPSULE | Refills: 3 | Status: SHIPPED | OUTPATIENT
Start: 2021-09-27 | End: 2022-01-10 | Stop reason: SDUPTHER

## 2021-09-27 ASSESSMENT — ENCOUNTER SYMPTOMS
CONSTIPATION: 0
DIARRHEA: 0
CHEST TIGHTNESS: 0
SHORTNESS OF BREATH: 0

## 2021-09-27 NOTE — ASSESSMENT & PLAN NOTE
No problems identified on exam.  Due for fasting blood work which has been reordered. Will give flu vaccine today and Shingrix when she returns for blood work. Overdue for mammogram which has been ordered. Colonoscopy was in October 2018 was normal and should be repeated again in 2028. No indication for early DEXA scan. Does have excellent exercise habits.

## 2021-09-27 NOTE — PROGRESS NOTES
History Narrative    Not on file     Social Determinants of Health     Financial Resource Strain:     Difficulty of Paying Living Expenses:    Food Insecurity:     Worried About Running Out of Food in the Last Year:     [address] in the Last Year:    Transportation Needs:     Lack of Transportation (Medical):  Lack of Transportation (Non-Medical):    Physical Activity:     Days of Exercise per Week:     Minutes of Exercise per Session:    Stress:     Feeling of Stress :    Social Connections:     Frequency of Communication with Friends and Family:     Frequency of Social Gatherings with Friends and Family:     Attends Baptism Services:     Active Member of Clubs or Organizations:     Attends Club or Organization Meetings:     Marital Status:    Intimate Partner Violence:     Fear of Current or Ex-Partner:     Emotionally Abused:     Physically Abused:     Sexually Abused:      Family History   Problem Relation Age of Onset    High Blood Pressure Father     Diabetes Father     Alzheimer's Disease Mother     Bipolar Disorder Brother         Outpatient Medications Prior to Visit   Medication Sig Dispense Refill    folic acid (FOLVITE) 1 MG tablet TAKE ONE TABLET BY MOUTH DAILY 90 tablet 3    Cyanocobalamin 500 MCG SUBL Place 1,000 mcg under the tongue daily 60 tablet 5    FLUoxetine (PROZAC) 10 MG capsule Take 1 capsule by mouth daily 90 capsule 3     No facility-administered medications prior to visit. Patient'spast medical history, surgical history, family history, medications,  and allergies  were all reviewed and updated as appropriate today. Review of Systems   Constitutional: Negative for appetite change, fatigue and fever. Respiratory: Negative for chest tightness and shortness of breath. Cardiovascular: Negative for chest pain. Gastrointestinal: Negative for constipation and diarrhea. Skin: Negative for rash.        /60   Pulse 60   Wt 155 lb 9.6 oz (70.6 kg)   SpO2 99%   BMI 22.33 kg/m²   Physical Exam  Vitals and nursing note reviewed. Constitutional:       Appearance: She is well-developed. She is not toxic-appearing. HENT:      Head: Normocephalic. Right Ear: Tympanic membrane, ear canal and external ear normal.      Left Ear: Tympanic membrane, ear canal and external ear normal.      Mouth/Throat:      Pharynx: No oropharyngeal exudate or posterior oropharyngeal erythema. Eyes:      General: No scleral icterus. Extraocular Movements: Extraocular movements intact. Conjunctiva/sclera: Conjunctivae normal.      Pupils: Pupils are equal, round, and reactive to light. Neck:      Thyroid: No thyroid mass or thyromegaly. Vascular: No carotid bruit. Cardiovascular:      Rate and Rhythm: Normal rate and regular rhythm. Heart sounds: Normal heart sounds. No murmur heard. Pulmonary:      Effort: Pulmonary effort is normal.      Breath sounds: Normal breath sounds. Abdominal:      Palpations: Abdomen is soft. Tenderness: There is no abdominal tenderness. Musculoskeletal:      Right lower leg: No edema. Left lower leg: No edema. Lymphadenopathy:      Cervical: No cervical adenopathy. Neurological:      General: No focal deficit present. Mental Status: She is alert and oriented to person, place, and time. Psychiatric:         Mood and Affect: Mood normal.         Behavior: Behavior normal. Behavior is cooperative. ASSESSMENT/PLAN:    Problem List Items Addressed This Visit     Celiac sprue      Remains on gluten-free diet. Asymptomatic. Check N39 and folic acid level. She does take an M54 and folic acid supplement. Relevant Medications    folic acid (FOLVITE) 1 MG tablet    Other Relevant Orders    CBC Auto Differential    VITAMIN B12 & FOLATE    Depressive disorder      Doing well on Prozac 20 mg daily. Continue.          Relevant Medications    FLUoxetine (PROZAC) 10 MG capsule Vitamin D insufficiency      Check vitamin D level. Relevant Orders    VITAMIN D 22 HYDROXY    Well adult exam - Primary      No problems identified on exam.  Due for fasting blood work which has been reordered. Will give flu vaccine today and Shingrix when she returns for blood work. Overdue for mammogram which has been ordered. Colonoscopy was in October 2018 was normal and should be repeated again in 2028. No indication for early DEXA scan. Does have excellent exercise habits. Relevant Orders    CBC Auto Differential    Comprehensive Metabolic Panel    Lipid Panel    TSH with Reflex    VITAMIN B12 & FOLATE    VITAMIN D 25 HYDROXY      Other Visit Diagnoses     Encounter for screening mammogram for malignant neoplasm of breast        Relevant Orders    MELISSA DIGITAL SCREEN W OR WO CAD BILATERAL          Current Outpatient Medications   Medication Sig Dispense Refill    FLUoxetine (PROZAC) 10 MG capsule Take 1 capsule by mouth daily 90 capsule 3    Cyanocobalamin 500 MCG SUBL Place 1,000 mcg under the tongue daily 90 tablet 3    folic acid (FOLVITE) 1 MG tablet Take 1 tablet by mouth daily 90 tablet 3     No current facility-administered medications for this visit. Return in about 6 months (around 3/27/2022).

## 2021-10-04 ENCOUNTER — HOSPITAL ENCOUNTER (OUTPATIENT)
Age: 61
Discharge: HOME | End: 2021-10-04
Payer: COMMERCIAL

## 2021-10-04 DIAGNOSIS — K90.0 CELIAC SPRUE: ICD-10-CM

## 2021-10-04 DIAGNOSIS — Z00.00 WELL ADULT EXAM: ICD-10-CM

## 2021-10-04 DIAGNOSIS — E55.9 VITAMIN D INSUFFICIENCY: ICD-10-CM

## 2021-10-04 LAB
A/G RATIO: 1.7 (ref 1.1–2.2)
ALBUMIN SERPL-MCNC: 4.3 G/DL (ref 3.4–5)
ALP BLD-CCNC: 56 U/L (ref 40–129)
ALT SERPL-CCNC: 7 U/L (ref 10–40)
ANION GAP SERPL CALCULATED.3IONS-SCNC: 11 MMOL/L (ref 3–16)
AST SERPL-CCNC: 17 U/L (ref 15–37)
BASOPHILS ABSOLUTE: 0.1 K/UL (ref 0–0.2)
BASOPHILS RELATIVE PERCENT: 2.2 %
BILIRUB SERPL-MCNC: 0.3 MG/DL (ref 0–1)
BUN BLDV-MCNC: 9 MG/DL (ref 7–20)
CALCIUM SERPL-MCNC: 9.4 MG/DL (ref 8.3–10.6)
CHLORIDE BLD-SCNC: 106 MMOL/L (ref 99–110)
CHOLESTEROL, TOTAL: 163 MG/DL (ref 0–199)
CO2: 25 MMOL/L (ref 21–32)
CREAT SERPL-MCNC: 0.8 MG/DL (ref 0.6–1.1)
EOSINOPHILS ABSOLUTE: 0.2 K/UL (ref 0–0.6)
EOSINOPHILS RELATIVE PERCENT: 4.4 %
FOLATE: >20 NG/ML (ref 4.78–24.2)
GFR AFRICAN AMERICAN: >60
GFR NON-AFRICAN AMERICAN: >60
GLOBULIN: 2.5 G/DL
GLUCOSE BLD-MCNC: 83 MG/DL (ref 70–99)
HCT VFR BLD CALC: 38.1 % (ref 36–48)
HDLC SERPL-MCNC: 44 MG/DL (ref 40–60)
HEMOGLOBIN: 12.4 G/DL (ref 12–16)
LDL CHOLESTEROL CALCULATED: 106 MG/DL
LYMPHOCYTES ABSOLUTE: 1.2 K/UL (ref 1–5.1)
LYMPHOCYTES RELATIVE PERCENT: 27.7 %
MCH RBC QN AUTO: 30.3 PG (ref 26–34)
MCHC RBC AUTO-ENTMCNC: 32.5 G/DL (ref 31–36)
MCV RBC AUTO: 93.3 FL (ref 80–100)
MONOCYTES ABSOLUTE: 0.4 K/UL (ref 0–1.3)
MONOCYTES RELATIVE PERCENT: 9.5 %
NEUTROPHILS ABSOLUTE: 2.5 K/UL (ref 1.7–7.7)
NEUTROPHILS RELATIVE PERCENT: 56.2 %
PDW BLD-RTO: 14.5 % (ref 12.4–15.4)
PLATELET # BLD: 262 K/UL (ref 135–450)
PMV BLD AUTO: 10.1 FL (ref 5–10.5)
POTASSIUM SERPL-SCNC: 4.1 MMOL/L (ref 3.5–5.1)
RBC # BLD: 4.08 M/UL (ref 4–5.2)
SODIUM BLD-SCNC: 142 MMOL/L (ref 136–145)
TOTAL PROTEIN: 6.8 G/DL (ref 6.4–8.2)
TRIGL SERPL-MCNC: 64 MG/DL (ref 0–150)
TSH REFLEX: 0.96 UIU/ML (ref 0.27–4.2)
VITAMIN B-12: 1201 PG/ML (ref 211–911)
VITAMIN D 25-HYDROXY: 38.5 NG/ML
VLDLC SERPL CALC-MCNC: 13 MG/DL
WBC # BLD: 4.4 K/UL (ref 4–11)

## 2021-10-04 PROCEDURE — 82607 VITAMIN B-12: CPT

## 2021-10-04 PROCEDURE — 36415 COLL VENOUS BLD VENIPUNCTURE: CPT

## 2021-10-04 PROCEDURE — 80061 LIPID PANEL: CPT

## 2021-10-04 PROCEDURE — 82746 ASSAY OF FOLIC ACID SERUM: CPT

## 2021-10-04 PROCEDURE — 82306 VITAMIN D 25 HYDROXY: CPT

## 2021-10-04 PROCEDURE — 85025 COMPLETE CBC W/AUTO DIFF WBC: CPT

## 2021-10-04 PROCEDURE — 80053 COMPREHEN METABOLIC PANEL: CPT

## 2021-10-04 PROCEDURE — 84443 ASSAY THYROID STIM HORMONE: CPT

## 2021-10-14 ENCOUNTER — HOSPITAL ENCOUNTER (OUTPATIENT)
Dept: WOMENS IMAGING | Age: 61
Discharge: HOME | End: 2021-10-14
Payer: COMMERCIAL

## 2021-10-14 VITALS — BODY MASS INDEX: 22.19 KG/M2 | HEIGHT: 70 IN | WEIGHT: 155 LBS

## 2021-10-14 DIAGNOSIS — Z12.31 ENCOUNTER FOR SCREENING MAMMOGRAM FOR MALIGNANT NEOPLASM OF BREAST: ICD-10-CM

## 2021-10-14 PROCEDURE — 77067 SCR MAMMO BI INCL CAD: CPT

## 2021-10-15 ENCOUNTER — TELEPHONE (OUTPATIENT)
Dept: WOMENS IMAGING | Age: 61
End: 2021-10-15

## 2021-10-15 NOTE — TELEPHONE ENCOUNTER
IN [facility] regarding screening mammogram results and follow up imaging recommendations.

## 2021-10-18 ENCOUNTER — TELEPHONE (OUTPATIENT)
Dept: WOMENS IMAGING | Age: 61
End: 2021-10-18

## 2021-10-21 ENCOUNTER — TELEPHONE (OUTPATIENT)
Dept: WOMENS IMAGING | Age: 61
End: 2021-10-21

## 2021-10-24 DIAGNOSIS — R92.8 ABNORMAL MAMMOGRAM: Primary | ICD-10-CM

## 2021-10-25 ENCOUNTER — TELEPHONE (OUTPATIENT)
Dept: WOMENS IMAGING | Age: 61
End: 2021-10-25

## 2021-10-27 PROBLEM — Z00.00 WELL ADULT EXAM: Status: RESOLVED | Noted: 2018-09-10 | Resolved: 2021-10-27

## 2021-12-03 ENCOUNTER — HOSPITAL ENCOUNTER (OUTPATIENT)
Dept: ULTRASOUND IMAGING | Age: 61
Discharge: HOME | End: 2021-12-03
Payer: COMMERCIAL

## 2021-12-03 ENCOUNTER — HOSPITAL ENCOUNTER (OUTPATIENT)
Dept: WOMENS IMAGING | Age: 61
Discharge: HOME | End: 2021-12-03
Payer: COMMERCIAL

## 2021-12-03 DIAGNOSIS — R92.8 ABNORMAL MAMMOGRAM: ICD-10-CM

## 2021-12-03 PROCEDURE — 77065 DX MAMMO INCL CAD UNI: CPT

## 2022-01-10 ENCOUNTER — TELEPHONE (OUTPATIENT)
Dept: INTERNAL MEDICINE CLINIC | Age: 62
End: 2022-01-10

## 2022-01-10 RX ORDER — FLUOXETINE 10 MG/1
10 CAPSULE ORAL DAILY
Qty: 90 CAPSULE | Refills: 3 | Status: SHIPPED | OUTPATIENT
Start: 2022-01-10 | End: 2022-03-28 | Stop reason: SDUPTHER

## 2022-01-10 NOTE — TELEPHONE ENCOUNTER
Pt calling [facility] is telling her there are no refills on the Cyanocebalamin or the Fluxoxetine ---she should have 3 refills on both---can you help her please. Thanks. And let pt know when fixed. Thanks.

## 2022-01-10 NOTE — TELEPHONE ENCOUNTER
Medications were sent to the pharmacy for the pt. Tried to contact pt and was unable to leave a message due to the mailbox being full. Pharmacy will contact pt when scripts are ready.

## 2022-03-28 ENCOUNTER — OFFICE VISIT (OUTPATIENT)
Dept: INTERNAL MEDICINE CLINIC | Age: 62
End: 2022-03-28
Payer: COMMERCIAL

## 2022-03-28 VITALS
WEIGHT: 151.2 LBS | BODY MASS INDEX: 21.69 KG/M2 | DIASTOLIC BLOOD PRESSURE: 82 MMHG | SYSTOLIC BLOOD PRESSURE: 104 MMHG | OXYGEN SATURATION: 98 % | HEART RATE: 56 BPM

## 2022-03-28 DIAGNOSIS — E55.9 VITAMIN D INSUFFICIENCY: ICD-10-CM

## 2022-03-28 DIAGNOSIS — Z13.820 SCREENING FOR OSTEOPOROSIS: ICD-10-CM

## 2022-03-28 DIAGNOSIS — K90.0 CELIAC SPRUE: ICD-10-CM

## 2022-03-28 DIAGNOSIS — Z00.00 WELL ADULT EXAM: ICD-10-CM

## 2022-03-28 DIAGNOSIS — R41.89 COGNITIVE IMPAIRMENT: ICD-10-CM

## 2022-03-28 DIAGNOSIS — F32.5 MAJOR DEPRESSIVE DISORDER WITH SINGLE EPISODE, IN FULL REMISSION (HCC): Primary | ICD-10-CM

## 2022-03-28 LAB
BASOPHILS ABSOLUTE: 0.2 K/UL (ref 0–0.2)
BASOPHILS RELATIVE PERCENT: 3.3 %
EOSINOPHILS ABSOLUTE: 0.3 K/UL (ref 0–0.6)
EOSINOPHILS RELATIVE PERCENT: 5 %
FOLATE: >20 NG/ML (ref 4.78–24.2)
HCT VFR BLD CALC: 38.9 % (ref 36–48)
HEMOGLOBIN: 12.7 G/DL (ref 12–16)
LYMPHOCYTES ABSOLUTE: 1.4 K/UL (ref 1–5.1)
LYMPHOCYTES RELATIVE PERCENT: 28.5 %
MCH RBC QN AUTO: 29.9 PG (ref 26–34)
MCHC RBC AUTO-ENTMCNC: 32.7 G/DL (ref 31–36)
MCV RBC AUTO: 91.4 FL (ref 80–100)
MONOCYTES ABSOLUTE: 0.6 K/UL (ref 0–1.3)
MONOCYTES RELATIVE PERCENT: 12.9 %
NEUTROPHILS ABSOLUTE: 2.5 K/UL (ref 1.7–7.7)
NEUTROPHILS RELATIVE PERCENT: 50.3 %
PDW BLD-RTO: 15 % (ref 12.4–15.4)
PLATELET # BLD: 300 K/UL (ref 135–450)
PMV BLD AUTO: 9.7 FL (ref 5–10.5)
RBC # BLD: 4.26 M/UL (ref 4–5.2)
VITAMIN B-12: 938 PG/ML (ref 211–911)
VITAMIN D 25-HYDROXY: 31.4 NG/ML
WBC # BLD: 5 K/UL (ref 4–11)

## 2022-03-28 PROCEDURE — 99214 OFFICE O/P EST MOD 30 MIN: CPT | Performed by: INTERNAL MEDICINE

## 2022-03-28 RX ORDER — FLUOXETINE 10 MG/1
10 CAPSULE ORAL DAILY
Qty: 90 CAPSULE | Refills: 3 | Status: SHIPPED | OUTPATIENT
Start: 2022-03-28

## 2022-03-28 RX ORDER — FOLIC ACID 1 MG/1
1 TABLET ORAL DAILY
Qty: 90 TABLET | Refills: 3 | Status: SHIPPED | OUTPATIENT
Start: 2022-03-28

## 2022-03-28 ASSESSMENT — PATIENT HEALTH QUESTIONNAIRE - PHQ9
SUM OF ALL RESPONSES TO PHQ9 QUESTIONS 1 & 2: 0
2. FEELING DOWN, DEPRESSED OR HOPELESS: 0
1. LITTLE INTEREST OR PLEASURE IN DOING THINGS: 0
SUM OF ALL RESPONSES TO PHQ QUESTIONS 1-9: 0

## 2022-03-28 ASSESSMENT — ENCOUNTER SYMPTOMS
DIARRHEA: 0
CHEST TIGHTNESS: 0
CONSTIPATION: 0
SHORTNESS OF BREATH: 0

## 2022-03-28 NOTE — PROGRESS NOTES
Patient: Eliecer Martinez is a 63 y.o. female who presents today with the following Chief Complaint(s):  Chief Complaint   Patient presents with    6 Month Follow-Up       HPI     Here today for follow up. Depression is doing very well with Prozac. Does not have any memory issues. Only issue today is that she will feel light headed at night when she wakes up to use the restroom. Does ok if she just sits for a few seconds and then is ok. No issues with Celiac sprue. Manages with gluten-free diet. Does walk daily for exercise. No weights.      Results for orders placed or performed during the hospital encounter of 10/04/21   VITAMIN D 25 HYDROXY   Result Value Ref Range    Vit D, 25-Hydroxy 38.5 >=30 ng/mL   VITAMIN B12 & FOLATE   Result Value Ref Range    Vitamin B-12 1201 (H) 211 - 911 pg/mL    Folate >20.00 4.78 - 24.20 ng/mL   TSH with Reflex   Result Value Ref Range    TSH 0.96 0.27 - 4.20 uIU/mL   Lipid Panel   Result Value Ref Range    Cholesterol, Total 163 0 - 199 mg/dL    Triglycerides 64 0 - 150 mg/dL    HDL 44 40 - 60 mg/dL    LDL Calculated 106 (H) <100 mg/dL    VLDL Cholesterol Calculated 13 Not Established mg/dL   Comprehensive Metabolic Panel   Result Value Ref Range    Sodium 142 136 - 145 mmol/L    Potassium 4.1 3.5 - 5.1 mmol/L    Chloride 106 99 - 110 mmol/L    CO2 25 21 - 32 mmol/L    Anion Gap 11 3 - 16    Glucose 83 70 - 99 mg/dL    BUN 9 7 - 20 mg/dL    CREATININE 0.8 0.6 - 1.1 mg/dL    GFR Non-African American >60 >60    GFR African American >60 >60    Calcium 9.4 8.3 - 10.6 mg/dL    Total Protein 6.8 6.4 - 8.2 g/dL    Albumin 4.3 3.4 - 5.0 g/dL    Albumin/Globulin Ratio 1.7 1.1 - 2.2    Total Bilirubin 0.3 0.0 - 1.0 mg/dL    Alkaline Phosphatase 56 40 - 129 U/L    ALT 7 (L) 10 - 40 U/L    AST 17 15 - 37 U/L    Globulin 2.5 g/dL   CBC Auto Differential   Result Value Ref Range    WBC 4.4 4.0 - 11.0 K/uL    RBC 4.08 4.00 - 5.20 M/uL    Hemoglobin 12.4 12.0 - 16.0 g/dL    Hematocrit 38.1 36.0 - 48.0 %    MCV 93.3 80.0 - 100.0 fL    MCH 30.3 26.0 - 34.0 pg    MCHC 32.5 31.0 - 36.0 g/dL    RDW 14.5 12.4 - 15.4 %    Platelets 822 211 - 870 K/uL    MPV 10.1 5.0 - 10.5 fL    Neutrophils % 56.2 %    Lymphocytes % 27.7 %    Monocytes % 9.5 %    Eosinophils % 4.4 %    Basophils % 2.2 %    Neutrophils Absolute 2.5 1.7 - 7.7 K/uL    Lymphocytes Absolute 1.2 1.0 - 5.1 K/uL    Monocytes Absolute 0.4 0.0 - 1.3 K/uL    Eosinophils Absolute 0.2 0.0 - 0.6 K/uL    Basophils Absolute 0.1 0.0 - 0.2 K/uL      The 10-year ASCVD risk score (Landy Wilkins, et al., 2013) is: 2.2%    Values used to calculate the score:      Age: 63 years      Sex: Female      Is Non- : No      Diabetic: No      Tobacco smoker: No      Systolic Blood Pressure: 643 mmHg      Is BP treated: No      HDL Cholesterol: 44 mg/dL      Total Cholesterol: 163 mg/dL      Allergies   Allergen Reactions    Gluten Meal Diarrhea      Past Medical History:   Diagnosis Date    Celiac disease/sprue       Past Surgical History:   Procedure Laterality Date    COLONOSCOPY  10/04/2018    MANDIBLE RECONSTRUCTION      UT COLONOSCOPY FLX DX W/COLLJ SPEC WHEN PFRMD N/A 10/4/2018    COLONOSCOPY DIAGNOSTIC OR SCREENING performed by Tika Mao MD at [address] History     Socioeconomic History    Marital status:      Spouse name: Not on file    Number of children: Not on file    Years of education: Not on file    Highest education level: Not on file   Occupational History    Occupation: customer service     Employer: [employer]   Tobacco Use    Smoking status: Former Smoker     Packs/day: 0.50     Years: 20.00     Pack years: 10.00     Start date: 9/10/2018     Quit date: 9/10/2018     Years since quitting: 3.5    Smokeless tobacco: Never Used   Substance and Sexual Activity    Alcohol use:  Yes     Alcohol/week: 3.0 standard drinks     Types: 3 Glasses of wine per week     Comment: wine    Drug use: No    Sexual activity: Not on file   Other Topics Concern    Not on file   Social History Narrative    Not on file     Social Determinants of Health     Financial Resource Strain:     Difficulty of Paying Living Expenses: Not on file   Food Insecurity:     Worried About Running Out of Food in the Last Year: Not on file    Livan of Food in the Last Year: Not on file   Transportation Needs:     Lack of Transportation (Medical): Not on file    Lack of Transportation (Non-Medical):  Not on file   Physical Activity:     Days of Exercise per Week: Not on file    Minutes of Exercise per Session: Not on file   Stress:     Feeling of Stress : Not on file   Social Connections:     Frequency of Communication with Friends and Family: Not on file    Frequency of Social Gatherings with Friends and Family: Not on file    Attends Evangelical Services: Not on file    Active Member of [address] [place] [address] Dials or Organizations: Not on file    Attends Club or Organization Meetings: Not on file    Marital Status: Not on file   Intimate Partner Violence:     Fear of Current or Ex-Partner: Not on file    Emotionally Abused: Not on file    Physically Abused: Not on file    Sexually Abused: Not on file   Housing Stability:     Unable to Pay for Housing in the Last Year: Not on file    Number of [place] in the Last Year: Not on file    Unstable Housing in the Last Year: Not on file     Family History   Problem Relation Age of Onset    High Blood Pressure Father     Diabetes Father     Alzheimer's Disease Mother     Bipolar Disorder Brother         Outpatient Medications Prior to Visit   Medication Sig Dispense Refill    Cyanocobalamin (B-12 PO) Take by mouth      FLUoxetine (PROZAC) 10 MG capsule Take 1 capsule by mouth daily 90 capsule 3    Cyanocobalamin 500 MCG SUBL Place 1,000 mcg under the tongue daily (Patient not taking: Reported on 3/28/2022) 90 tablet 3    folic acid (FOLVITE) 1 MG tablet Take 1 tablet by mouth daily 90 tablet 3     No facility-administered medications prior to visit. Patient'spast medical history, surgical history, family history, medications,  and allergies  were all reviewed and updated as appropriate today. Review of Systems   Constitutional: Negative for appetite change, fatigue and fever. Respiratory: Negative for chest tightness and shortness of breath. Cardiovascular: Negative for chest pain. Gastrointestinal: Negative for constipation and diarrhea. Skin: Negative for rash. /82   Pulse 56   Wt 151 lb 3.2 oz (68.6 kg)   SpO2 98%   BMI 21.69 kg/m²   Physical Exam  Vitals and nursing note reviewed. Constitutional:       Appearance: She is well-developed. She is not toxic-appearing. HENT:      Head: Normocephalic. Right Ear: Tympanic membrane, ear canal and external ear normal.      Left Ear: Tympanic membrane, ear canal and external ear normal.      Mouth/Throat:      Pharynx: No oropharyngeal exudate or posterior oropharyngeal erythema. Eyes:      General: No scleral icterus. Extraocular Movements: Extraocular movements intact. Conjunctiva/sclera: Conjunctivae normal.      Pupils: Pupils are equal, round, and reactive to light. Neck:      Thyroid: No thyroid mass or thyromegaly. Vascular: No carotid bruit. Cardiovascular:      Rate and Rhythm: Normal rate and regular rhythm. Heart sounds: Normal heart sounds. No murmur heard. Pulmonary:      Effort: Pulmonary effort is normal.      Breath sounds: Normal breath sounds. Abdominal:      Palpations: Abdomen is soft. There is no mass. Tenderness: There is no abdominal tenderness. Musculoskeletal:      Right lower leg: No edema. Left lower leg: No edema. Lymphadenopathy:      Cervical: No cervical adenopathy. Right cervical: No superficial or posterior cervical adenopathy. Left cervical: No superficial or posterior cervical adenopathy.    Neurological: General: No focal deficit present. Mental Status: She is alert and oriented to person, place, and time. Psychiatric:         Mood and Affect: Mood normal.         Behavior: Behavior normal. Behavior is cooperative. ASSESSMENT/PLAN:    Problem List Items Addressed This Visit     Celiac arelis      Remains on gluten-free diet. Asymptomatic. Check J63 and folic acid level. She does take an C19 and folic acid supplement. Check CBC. Relevant Medications    folic acid (FOLVITE) 1 MG tablet    Other Relevant Orders    Vitamin B12 & Folate (Completed)    CBC with Auto Differential (Completed)    Vitamin B12 & Folate    Cognitive impairment      Resolved with treating depression. Continue Prozac 10 mg daily. Major depressive disorder with single episode, in full remission ([facility] [address] - Primary     Doing very well on Prozac 10 mg daily. Continue. Cognitive issues have resolved. Relevant Medications    FLUoxetine (PROZAC) 10 MG capsule    Vitamin D insufficiency      Check vitamin D level. Continue over-the-counter vitamin D3. Relevant Orders    Vitamin D 25 Hydroxy (Completed)    Vitamin D 25 Hydroxy      Other Visit Diagnoses     Screening for osteoporosis        Relevant Orders    DEXA BONE DENSITY AXIAL SKELETON    Well adult exam        Relevant Orders    CBC with Auto Differential    Comprehensive Metabolic Panel    Lipid Panel    TSH with Reflex    Vitamin D 25 Hydroxy    Vitamin B12 & Folate          Current Outpatient Medications   Medication Sig Dispense Refill    FLUoxetine (PROZAC) 10 MG capsule Take 1 capsule by mouth daily 90 capsule 3    Cyanocobalamin 500 MCG SUBL Place 1,000 mcg under the tongue daily 90 tablet 3    folic acid (FOLVITE) 1 MG tablet Take 1 tablet by mouth daily 90 tablet 3    Cyanocobalamin (B-12 PO) Take by mouth       No current facility-administered medications for this visit.        Return in about 6 months (around 9/28/2022) for wellness.

## 2022-03-28 NOTE — PATIENT INSTRUCTIONS
Please call [phone] ([phone])  to schedule Bone Density Test. Please get this done between now and your next appointment. The bone density test helps me assess your bone health and risk for breaking bones.

## 2022-04-03 PROBLEM — F32.5 MAJOR DEPRESSIVE DISORDER WITH SINGLE EPISODE, IN FULL REMISSION (HCC): Status: ACTIVE | Noted: 2020-09-01

## 2022-04-03 NOTE — ASSESSMENT & PLAN NOTE
Remains on gluten-free diet. Asymptomatic. Check Y45 and folic acid level. She does take an S98 and folic acid supplement. Check CBC.

## 2022-04-05 ENCOUNTER — TELEPHONE (OUTPATIENT)
Dept: INTERNAL MEDICINE CLINIC | Age: 62
End: 2022-04-05

## 2022-04-05 NOTE — TELEPHONE ENCOUNTER
Patient calling back. She states she still has [place] we have on file with same ID# and group# and that she does not have access to a card to send to office. She states she is walking into work right now but she will look for card later.

## 2022-04-05 NOTE — TELEPHONE ENCOUNTER
Need pt's updated insurance card. LVM for pt to have her upload it on [employer], fax it or bring it to the office.

## 2022-08-15 ENCOUNTER — TELEPHONE (OUTPATIENT)
Dept: INTERNAL MEDICINE CLINIC | Age: 62
End: 2022-08-15

## 2022-08-15 NOTE — TELEPHONE ENCOUNTER
----- Message from Sofia Dela Cruz sent at 8/15/2022 12:31 PM EDT -----  Subject: Medication Problem    Medication: Cyanocobalamin (B-12 PO)  Dosage: Take by mouth  Ordering Provider: Anna Bell    Question/Problem: Nae Rosenbaum called on 08/25 @ 12:29 pm. Her B-12 was   supposed to have some extra prescription included, but it was not in her   pharmacy. Please call to advise.   Additional Information for Provider:     Pharmacy: [facility] [id number] [id number], [address] [place] [address]   [phone] Francisco Rojas [phone]    ---------------------------------------------------------------------------  --------------  [facility]  [phone]; OK to leave message on voicemail  ---------------------------------------------------------------------------  --------------    SCRIPT ANSWERS  Relationship to Patient: Self

## 2022-10-03 ENCOUNTER — OFFICE VISIT (OUTPATIENT)
Dept: INTERNAL MEDICINE CLINIC | Age: 62
End: 2022-10-03

## 2022-10-03 VITALS
BODY MASS INDEX: 22.44 KG/M2 | DIASTOLIC BLOOD PRESSURE: 68 MMHG | OXYGEN SATURATION: 97 % | HEART RATE: 72 BPM | SYSTOLIC BLOOD PRESSURE: 116 MMHG | WEIGHT: 156.4 LBS

## 2022-10-03 DIAGNOSIS — Z12.31 BREAST CANCER SCREENING BY MAMMOGRAM: ICD-10-CM

## 2022-10-03 DIAGNOSIS — K90.0 CELIAC SPRUE: ICD-10-CM

## 2022-10-03 DIAGNOSIS — Z78.0 MENOPAUSE: ICD-10-CM

## 2022-10-03 DIAGNOSIS — R41.3 MEMORY PROBLEM: ICD-10-CM

## 2022-10-03 DIAGNOSIS — F32.5 MAJOR DEPRESSIVE DISORDER WITH SINGLE EPISODE, IN FULL REMISSION (HCC): ICD-10-CM

## 2022-10-03 DIAGNOSIS — Z00.00 ENCOUNTER FOR WELL ADULT EXAM WITHOUT ABNORMAL FINDINGS: Primary | ICD-10-CM

## 2022-10-03 ASSESSMENT — ENCOUNTER SYMPTOMS
SHORTNESS OF BREATH: 0
CHEST TIGHTNESS: 0
CONSTIPATION: 0
DIARRHEA: 0

## 2022-10-03 NOTE — PATIENT INSTRUCTIONS
You did well on your memory test for me today. It is a screening test. Because your son and your employer did notice some issues with your memory, I do want you to have more extensive testing done to make sure that there is not a problem that was not picked up on the screening test.     I have placed a referral for you to get more extensive memory testing done. Please call any of the below providers to make an appointment. If they need a copy of the referral faxed to their office, please let us know and we will fax it. Please make an appointment to see me approximately 1 month after your evaluation. Jeanne Colby, PhD  [address] [facility], [address] [place] [address]  [phone]    [facility] [address]  [address], [place] [facility]  [phone]    [facility] [address]  [phone]    Please call [phone] ([phone])  to schedule your mammogram and DEXA scan for December. I have also ordered a CT scan of your brain because of your memory problems. You should do the CT scan now. We will call you with the results.

## 2022-10-03 NOTE — PROGRESS NOTES
Well Adult Note  Name: Ivy Ventura Date: 10/9/2022   MRN: [id number] Sex: Female   Age: 63 y.o. Ethnicity: Non- / Non    : 1960 Race: White (non-)      Curly Rai is here for well adult exam.  History:    Here today for wellness visit. Is concerned about her memory. Has not been recalling things as sharply as she should. Has been noticeable to her son and DIL. Lost her job last week because she had made a transposition error at work. Depression is ok. Does not feel like she is depressed. Colonoscopy , normal. Due for repeat in . Mammogram 12/3/21 normal. Due for repeat. Wondering if she needs it yearly or every other year. Does need a pap smear. Will get Flu vaccine today. [facility]- walks daily x 20 minutes. No weights. Review of Systems   Constitutional:  Negative for appetite change, fatigue and fever. Respiratory:  Negative for chest tightness and shortness of breath. Cardiovascular:  Negative for chest pain. Gastrointestinal:  Negative for constipation and diarrhea. Skin:  Negative for rash. Allergies   Allergen Reactions    Gluten Meal Diarrhea         Prior to Visit Medications    Medication Sig Taking?  Authorizing Provider   Cyanocobalamin 500 MCG SUBL Place 1,000 mcg under the tongue daily Yes Jonathon Sanchez, DO   FLUoxetine (PROZAC) 10 MG capsule Take 1 capsule by mouth daily Yes Jonathon Sanchez DO   folic acid (FOLVITE) 1 MG tablet Take 1 tablet by mouth daily Yes Jonathon Sanchez, DO   Cyanocobalamin (B-12 PO) Take by mouth Yes Historical Provider, MD         Past Medical History:   Diagnosis Date    Celiac disease/sprue        Past Surgical History:   Procedure Laterality Date    COLONOSCOPY  10/04/2018    MANDIBLE RECONSTRUCTION      SC COLONOSCOPY FLX DX W/COLLJ SPEC WHEN PFRMD N/A 10/4/2018    COLONOSCOPY DIAGNOSTIC OR SCREENING performed by Carmina Villa MD at [address] ENDOSCOPY         Family History   Problem Relation Age of Onset    High Blood Pressure Father     Diabetes Father     Alzheimer's Disease Mother     Bipolar Disorder Brother        Social History     Tobacco Use    Smoking status: Former     Packs/day: 0.50     Years: 20.00     Pack years: 10.00     Types: Cigarettes     Start date: 2018     Quit date: 9/10/2018     Years since quittin.0    Smokeless tobacco: Never   Substance Use Topics    Alcohol use: Yes     Alcohol/week: 3.0 standard drinks     Types: 3 Glasses of wine per week     Comment: wine    Drug use: No       Objective   /68   Pulse 72   Wt 156 lb 6.4 oz (70.9 kg)   SpO2 97%   BMI 22.44 kg/m²   Wt Readings from Last 3 Encounters:   10/03/22 156 lb 6.4 oz (70.9 kg)   22 151 lb 3.2 oz (68.6 kg)   10/14/21 155 lb (70.3 kg)     There were no vitals filed for this visit. Physical Exam  Vitals and nursing note reviewed. Constitutional:       Appearance: She is well-developed. She is not toxic-appearing. HENT:      Head: Normocephalic. Right Ear: Tympanic membrane, ear canal and external ear normal.      Left Ear: Tympanic membrane, ear canal and external ear normal.      Mouth/Throat:      Pharynx: No oropharyngeal exudate or posterior oropharyngeal erythema. Eyes:      General: No scleral icterus. Extraocular Movements: Extraocular movements intact. Conjunctiva/sclera: Conjunctivae normal.      Pupils: Pupils are equal, round, and reactive to light. Neck:      Thyroid: No thyroid mass or thyromegaly. Vascular: No carotid bruit. Cardiovascular:      Rate and Rhythm: Normal rate and regular rhythm. Heart sounds: Normal heart sounds. No murmur heard. Pulmonary:      Effort: Pulmonary effort is normal.      Breath sounds: Normal breath sounds. Abdominal:      Palpations: Abdomen is soft. Tenderness: There is no abdominal tenderness.    Musculoskeletal:      Right lower leg: No edema. Left lower leg: No edema. Lymphadenopathy:      Cervical: No cervical adenopathy. Neurological:      General: No focal deficit present. Mental Status: She is alert and oriented to person, place, and time. Psychiatric:         Mood and Affect: Mood normal.         Behavior: Behavior normal. Behavior is cooperative. Comments: FILIPE 28/30 (10/3/22). Assessment   Plan   1. Encounter for well adult exam without abnormal findings  Assessment & Plan:  No problems identified on exam other than noted memory problems. Continue with diet and exercise. Fasting labs reviewed. 2. Menopause  Assessment & Plan:  Check DEXA scan. Orders:  -     DEXA BONE DENSITY AXIAL SKELETON; Future  3. Breast cancer screening by mammogram  -     [facility] TESSIE DIGITAL SCREEN BILATERAL; Future  4. Memory problem  Assessment & Plan:  [facility] 28/30 10/3/2022. Patient had a CT of her head about 2 years ago that was normal.  Repeat CT of head. Refer for neuropsych testing. Memory problems have been noticeable to patient to the point where she has lost her job. Her son and daughter-in-law have also noted than 3 problems. Orders:  -     External Referral To Psychology  -     CT HEAD WO CONTRAST; Future  5. Celiac sprue  Assessment & Plan:   Check vitamin Q39, folic acid level. 6. Major depressive disorder with single episode, in full remission [facility])  Assessment & Plan:  Continues to do well on Prozac 10 mg daily. Patient denies depression contributing to her symptoms of memory impairment.   Reviewed       Personalized Preventive Plan   Current Health Maintenance Status  Immunization History   Administered Date(s) Administered    COVID-19, MODERNA BLUE border, Primary or Immunocompromised, (age 12y+), IM, 100 mcg/0.5mL 03/30/2021, 04/27/2021    Influenza, FLUBLOK, (age 25 y+), PF, 0.5mL 09/05/2020    Influenza, FLUCELVAX, (age 10 mo+), MDCK, PF, 0.5mL 09/27/2021, 10/03/2022    Tdap (Boostrix, Adacel) 09/10/2018 Health Maintenance   Topic Date Due    HIV screen  Never done    Hepatitis C screen  Never done    Cervical cancer screen  Never done    Shingles vaccine (1 of 2) Never done    COVID-19 Vaccine (3 - Booster for Moderna series) 09/27/2021    Depression Monitoring  03/28/2023    Breast cancer screen  12/03/2023    Lipids  10/04/2026    DTaP/Tdap/Td vaccine (2 - Td or Tdap) 09/10/2028    Colorectal Cancer Screen  10/04/2028    Flu vaccine  Completed    Hepatitis A vaccine  Aged Out    Hib vaccine  Aged Out    Meningococcal (ACWY) vaccine  Aged Out    Pneumococcal 0-64 years Vaccine  Aged Out     Recommendations for [place] Due: see orders and patient instructions/AVS.    Return in about 6 months (around 4/3/2023) for pap amd Shingrix. Samuel Temple

## 2022-10-09 PROBLEM — R41.3 MEMORY PROBLEM: Status: ACTIVE | Noted: 2020-09-01

## 2022-10-10 NOTE — ASSESSMENT & PLAN NOTE
FILIPE 28/30 10/3/2022. Patient had a CT of her head about 2 years ago that was normal.  Repeat CT of head. Refer for neuropsych testing. Memory problems have been noticeable to patient to the point where she has lost her job. Her son and daughter-in-law have also noted than 3 problems.

## 2022-10-10 NOTE — ASSESSMENT & PLAN NOTE
No problems identified on exam other than noted memory problems. Continue with diet and exercise. Fasting labs reviewed.

## 2022-10-10 NOTE — ASSESSMENT & PLAN NOTE
Continues to do well on Prozac 10 mg daily. Patient denies depression contributing to her symptoms of memory impairment.   Reviewed

## 2022-10-17 ENCOUNTER — TELEPHONE (OUTPATIENT)
Dept: INTERNAL MEDICINE CLINIC | Age: 62
End: 2022-10-17

## 2022-11-08 PROBLEM — Z00.00 ENCOUNTER FOR WELL ADULT EXAM WITHOUT ABNORMAL FINDINGS: Status: RESOLVED | Noted: 2018-09-10 | Resolved: 2022-11-08

## 2022-11-14 ENCOUNTER — TELEPHONE (OUTPATIENT)
Dept: INTERNAL MEDICINE CLINIC | Age: 62
End: 2022-11-14

## 2022-11-14 NOTE — TELEPHONE ENCOUNTER
I don't think that he can set it up for her. I think that Nae needs to set it up herself and give him access. I recommend that he have Nae make an appointment and then he can be involved in the appointment virtually if he cannot be here in person ( I think that he lives in [place]).

## 2022-11-14 NOTE — TELEPHONE ENCOUNTER
Spoke with pt's brother and he would like to set up mychart for the pt. Dr Elvin Winchester can we discuss this tomorrow for the pt and her brother?

## 2022-11-14 NOTE — TELEPHONE ENCOUNTER
Pt brother Anthony Barger is calling---he is on the hippa form and has a letter about the pt he wants to email to you and then have you call him about her behavior etc---please call him at [phone] to give him email number. Thanks.

## 2022-11-16 NOTE — TELEPHONE ENCOUNTER
Pt brother is calling again---he is going to send a letter to Dr Christina Green thru [employer]---just an Neli Hernandez

## 2022-11-17 ENCOUNTER — TELEPHONE (OUTPATIENT)
Dept: INTERNAL MEDICINE CLINIC | Age: 62
End: 2022-11-17

## 2022-11-23 ENCOUNTER — TELEPHONE (OUTPATIENT)
Dept: INTERNAL MEDICINE CLINIC | Age: 62
End: 2022-11-23

## 2022-11-23 NOTE — TELEPHONE ENCOUNTER
Pt brother Samantha Bryant is calling asking to speak to you about the appt the pt has with Dr Everett Meyer on Monday---he thinks her condition is deteriorating ---he really would like to talk to you at [phone]. Thanks.

## 2023-02-13 ENCOUNTER — TELEPHONE (OUTPATIENT)
Dept: INTERNAL MEDICINE CLINIC | Age: 63
End: 2023-02-13

## 2023-02-13 NOTE — TELEPHONE ENCOUNTER
Pt calling to see if you received the report yet from Dr Jada Salmon [facility]---asking if there are any medical updates or changes--please call pt at [phone]. Thanks.

## 2023-02-14 RX ORDER — FLUOXETINE HYDROCHLORIDE 20 MG/1
20 CAPSULE ORAL DAILY
Qty: 90 CAPSULE | Refills: 1 | Status: SHIPPED | OUTPATIENT
Start: 2023-02-14

## 2023-02-14 NOTE — TELEPHONE ENCOUNTER
Increased to Prozac 20 mg. May take 2 of the 10 mg to use up. New rx sent to her pharmacy for 20 mg pills.

## 2023-02-14 NOTE — TELEPHONE ENCOUNTER
Spoke with pt. She and her brother are concerned that the pt should be on Prozac 20 mg instead of 10 mg that she is taking. She states she was on 20 mg at one time.

## 2023-04-03 ENCOUNTER — OFFICE VISIT (OUTPATIENT)
Dept: INTERNAL MEDICINE CLINIC | Age: 63
End: 2023-04-03

## 2023-04-03 VITALS
DIASTOLIC BLOOD PRESSURE: 60 MMHG | HEART RATE: 64 BPM | OXYGEN SATURATION: 97 % | WEIGHT: 147.6 LBS | SYSTOLIC BLOOD PRESSURE: 102 MMHG | HEIGHT: 70 IN | BODY MASS INDEX: 21.13 KG/M2

## 2023-04-03 DIAGNOSIS — F32.5 MAJOR DEPRESSIVE DISORDER WITH SINGLE EPISODE, IN FULL REMISSION (HCC): ICD-10-CM

## 2023-04-03 DIAGNOSIS — G31.84 MILD COGNITIVE IMPAIRMENT: Primary | ICD-10-CM

## 2023-04-03 DIAGNOSIS — F60.9 PERSONALITY DISORDER (HCC): ICD-10-CM

## 2023-04-03 SDOH — ECONOMIC STABILITY: INCOME INSECURITY: HOW HARD IS IT FOR YOU TO PAY FOR THE VERY BASICS LIKE FOOD, HOUSING, MEDICAL CARE, AND HEATING?: NOT HARD AT ALL

## 2023-04-03 SDOH — ECONOMIC STABILITY: HOUSING INSECURITY
IN THE LAST 12 MONTHS, WAS THERE A TIME WHEN YOU DID NOT HAVE A STEADY PLACE TO SLEEP OR SLEPT IN A SHELTER (INCLUDING NOW)?: NO

## 2023-04-03 SDOH — ECONOMIC STABILITY: FOOD INSECURITY: WITHIN THE PAST 12 MONTHS, YOU WORRIED THAT YOUR FOOD WOULD RUN OUT BEFORE YOU GOT MONEY TO BUY MORE.: NEVER TRUE

## 2023-04-03 SDOH — ECONOMIC STABILITY: FOOD INSECURITY: WITHIN THE PAST 12 MONTHS, THE FOOD YOU BOUGHT JUST DIDN'T LAST AND YOU DIDN'T HAVE MONEY TO GET MORE.: NEVER TRUE

## 2023-04-03 ASSESSMENT — PATIENT HEALTH QUESTIONNAIRE - PHQ9
7. TROUBLE CONCENTRATING ON THINGS, SUCH AS READING THE NEWSPAPER OR WATCHING TELEVISION: 0
5. POOR APPETITE OR OVEREATING: 0
SUM OF ALL RESPONSES TO PHQ QUESTIONS 1-9: 0
SUM OF ALL RESPONSES TO PHQ QUESTIONS 1-9: 0
8. MOVING OR SPEAKING SO SLOWLY THAT OTHER PEOPLE COULD HAVE NOTICED. OR THE OPPOSITE, BEING SO FIGETY OR RESTLESS THAT YOU HAVE BEEN MOVING AROUND A LOT MORE THAN USUAL: 0
4. FEELING TIRED OR HAVING LITTLE ENERGY: 0
9. THOUGHTS THAT YOU WOULD BE BETTER OFF DEAD, OR OF HURTING YOURSELF: 0
SUM OF ALL RESPONSES TO PHQ9 QUESTIONS 1 & 2: 0
3. TROUBLE FALLING OR STAYING ASLEEP: 0
10. IF YOU CHECKED OFF ANY PROBLEMS, HOW DIFFICULT HAVE THESE PROBLEMS MADE IT FOR YOU TO DO YOUR WORK, TAKE CARE OF THINGS AT HOME, OR GET ALONG WITH OTHER PEOPLE: 0
6. FEELING BAD ABOUT YOURSELF - OR THAT YOU ARE A FAILURE OR HAVE LET YOURSELF OR YOUR FAMILY DOWN: 0
2. FEELING DOWN, DEPRESSED OR HOPELESS: 0
SUM OF ALL RESPONSES TO PHQ QUESTIONS 1-9: 0
SUM OF ALL RESPONSES TO PHQ QUESTIONS 1-9: 0
1. LITTLE INTEREST OR PLEASURE IN DOING THINGS: 0

## 2023-04-03 NOTE — PROGRESS NOTES
tablet 3    folic acid (FOLVITE) 1 MG tablet Take 1 tablet by mouth daily 90 tablet 3    Cyanocobalamin (B-12 PO) Take by mouth       No facility-administered medications prior to visit. Patient'spast medical history, surgical history, family history, medications,  and allergies  were all reviewed and updated as appropriate today. Review of Systems   Constitutional:  Negative for appetite change, fatigue and fever. Respiratory:  Negative for chest tightness and shortness of breath. Cardiovascular:  Negative for chest pain. Gastrointestinal:  Negative for constipation and diarrhea. Skin:  Negative for rash. /60 (Site: Left Upper Arm, Position: Sitting, Cuff Size: Medium Adult)   Pulse 64   Ht 5' 10\" (1.778 m)   Wt 147 lb 9.6 oz (67 kg)   SpO2 97%   BMI 21.18 kg/m²   Physical Exam  Vitals and nursing note reviewed. Constitutional:       Appearance: She is well-developed. She is not toxic-appearing. HENT:      Head: Normocephalic. Right Ear: Tympanic membrane, ear canal and external ear normal.      Left Ear: Tympanic membrane, ear canal and external ear normal.      Mouth/Throat:      Pharynx: No oropharyngeal exudate or posterior oropharyngeal erythema. Eyes:      General: No scleral icterus. Extraocular Movements: Extraocular movements intact. Conjunctiva/sclera: Conjunctivae normal.      Pupils: Pupils are equal, round, and reactive to light. Neck:      Thyroid: No thyroid mass or thyromegaly. Vascular: No carotid bruit. Cardiovascular:      Rate and Rhythm: Normal rate and regular rhythm. Heart sounds: Normal heart sounds. No murmur heard. Pulmonary:      Effort: Pulmonary effort is normal.      Breath sounds: Normal breath sounds. Musculoskeletal:      Right lower leg: No edema. Left lower leg: No edema. Lymphadenopathy:      Cervical: No cervical adenopathy. Neurological:      General: No focal deficit present.       Mental Status: She

## 2023-04-03 NOTE — PATIENT INSTRUCTIONS
Please call [facility] to schedule an intake evaluation with a therapist for DBT (Dialectic Behavioral Therapy). DBT is a type of therapy that can help you change you thinking patterns and outlook on life. Ladonna Dexter has the number as well. You will need to have a follow up appointment with Dr. Bebe Reese in August to re-evaluate your memory issues. He is worried about a possible diagnosis of frontal-temporal lobe dementia. You currently do not have this diagnosis. Your diagnosis is MILD COGNITIVE IMPAIRMENT. Consider referrals to occupational therapy and speech therapy for possible cognitive therapies. Right now DBT is more important.      [facility] for [address] [phone]

## 2023-04-04 RX ORDER — MAGNESIUM 200 MG
TABLET ORAL
Qty: 90 TABLET | Refills: 3 | Status: SHIPPED | OUTPATIENT
Start: 2023-04-04

## 2023-04-09 PROBLEM — F60.9 PERSONALITY DISORDER (HCC): Status: ACTIVE | Noted: 2023-04-09

## 2023-04-09 PROBLEM — G31.84 MILD COGNITIVE IMPAIRMENT: Status: ACTIVE | Noted: 2020-09-01

## 2023-04-09 ASSESSMENT — ENCOUNTER SYMPTOMS
CONSTIPATION: 0
DIARRHEA: 0
CHEST TIGHTNESS: 0
SHORTNESS OF BREATH: 0

## 2023-04-09 NOTE — ASSESSMENT & PLAN NOTE
Patient underwent initial evaluation with Dr. Barbara Palacios for neuropsychiatric testing. Currently is diagnosed as having mild cognitive impairment. Patient have repeat evaluation in about 6 months. She is currently working part-time at [employer] and has had difficulties working as a  and are currently working to find her new department. She is also working with the [address] [facility] and hoping to get .

## 2023-04-09 NOTE — ASSESSMENT & PLAN NOTE
Patient has been diagnosed with personality disorder by Dr. Glenny Ceron. Other possibilities include frontotemporal lobe dementia. DBT has been recommended to try to help improve personality disorder symptoms which may be contributing to cognitive issues. Patient was referred to [facility] follow through with referral.  Discussed referral with patient and her brother at length. Patient's daughter will assist her in making this appointment.

## 2023-07-12 ENCOUNTER — OFFICE VISIT (OUTPATIENT)
Dept: INTERNAL MEDICINE CLINIC | Age: 63
End: 2023-07-12

## 2023-07-12 VITALS
HEART RATE: 64 BPM | SYSTOLIC BLOOD PRESSURE: 102 MMHG | BODY MASS INDEX: 20.66 KG/M2 | WEIGHT: 144 LBS | DIASTOLIC BLOOD PRESSURE: 68 MMHG | OXYGEN SATURATION: 97 %

## 2023-07-12 DIAGNOSIS — F60.9 PERSONALITY DISORDER (HCC): ICD-10-CM

## 2023-07-12 DIAGNOSIS — Z12.31 SCREENING MAMMOGRAM FOR BREAST CANCER: ICD-10-CM

## 2023-07-12 DIAGNOSIS — Z00.00 WELL ADULT EXAM: Primary | ICD-10-CM

## 2023-07-12 DIAGNOSIS — F32.5 MAJOR DEPRESSIVE DISORDER WITH SINGLE EPISODE, IN FULL REMISSION (HCC): ICD-10-CM

## 2023-07-12 DIAGNOSIS — E53.8 LOW FOLIC ACID: ICD-10-CM

## 2023-07-12 DIAGNOSIS — E55.9 VITAMIN D INSUFFICIENCY: ICD-10-CM

## 2023-07-12 DIAGNOSIS — G31.84 MILD COGNITIVE IMPAIRMENT: ICD-10-CM

## 2023-07-12 DIAGNOSIS — K90.0 CELIAC SPRUE: ICD-10-CM

## 2023-07-12 DIAGNOSIS — Z12.4 CERVICAL CANCER SCREENING: ICD-10-CM

## 2023-07-12 DIAGNOSIS — R30.0 DYSURIA: ICD-10-CM

## 2023-07-12 PROBLEM — M70.61 TROCHANTERIC BURSITIS OF RIGHT HIP: Status: RESOLVED | Noted: 2020-09-29 | Resolved: 2023-07-12

## 2023-07-12 LAB
BILIRUBIN, POC: NORMAL
BLOOD URINE, POC: NORMAL
CLARITY, POC: NORMAL
COLOR, POC: NORMAL
GLUCOSE URINE, POC: NORMAL
KETONES, POC: NORMAL
LEUKOCYTE EST, POC: NORMAL
NITRITE, POC: NORMAL
PH, POC: 5.5
PROTEIN, POC: NORMAL
SPECIFIC GRAVITY, POC: 1.02
UROBILINOGEN, POC: NORMAL

## 2023-07-12 RX ORDER — FLUOXETINE HYDROCHLORIDE 20 MG/1
CAPSULE ORAL
Qty: 90 CAPSULE | Refills: 1 | Status: SHIPPED | OUTPATIENT
Start: 2023-07-12

## 2023-07-12 NOTE — PROGRESS NOTES
Cervical cancer screening      Pap smear sent today. Relevant Orders    PAP SMEAR    Low folic acid      Check F49 and folic acid level. Relevant Orders    Vitamin B12 & Folate    Major depressive disorder with single episode, in full remission ([address])      Doing well on Prozac 20 mg daily. Continue. Mild cognitive impairment      Sounds to be fairly stable. Brother helps manage her finances. Denies difficulties remembering to take medication. Has appointment to see Dr. Avelino Milner again in August.           Personality disorder [facility])      Has follow-up appointment with Dr. Avelino Milner in August.  Was referred to [facility] at her last appointment. Seems to be doing well. Vitamin D insufficiency      Check vitamin D level. Relevant Orders    Vitamin D 25 Hydroxy    Well adult exam - Primary      Up-to-date on colonoscopy. Recommend Shingrix vaccine. Mammogram ordered and scheduled for Mammovan on 9/13/2023. Pap smear completed today. Relevant Orders    PAP SMEAR    TSH with Reflex    Comprehensive Metabolic Panel    Lipid Panel    Vitamin D 25 Hydroxy    POCT Urinalysis no Micro (Completed)     Other Visit Diagnoses       Dysuria        Relevant Orders    Culture, Urine    Screening mammogram for breast cancer        Relevant Orders    MELISSA TESSIE DIGITAL SCREEN BILATERAL            Current Outpatient Medications   Medication Sig Dispense Refill    FLUoxetine (PROZAC) 20 MG capsule TAKE ONE CAPSULE BY MOUTH DAILY 90 capsule 1    Cyanocobalamin (VITAMIN B-12) 1000 MCG SUBL PLACE ONE TABLET UNDER THE TONGUE DAILY 90 tablet 3    folic acid (FOLVITE) 1 MG tablet Take 1 tablet by mouth daily 90 tablet 3     No current facility-administered medications for this visit. Return for f/u 9/13 with mammovan as well. Thiago Baez

## 2023-07-12 NOTE — PATIENT INSTRUCTIONS
You need to get your Shingles vaccine. Shingles is a reactivation of the chicken pox virus that causes a very painful rash. The pain may not resolve when the rash resolves. You may get this vaccine here or at the pharmacy (even [place]). The vaccine will make you feel tired and possibly flu-like for 24 hours. You will need 2 shots spaced 2-6 months apart. You will be able to drive home. I recommend getting the Shingles Vaccine on a Tuesday after work so that you have Wednesday to rest and recover if needed.

## 2023-07-12 NOTE — ASSESSMENT & PLAN NOTE
Up-to-date on colonoscopy. Recommend Shingrix vaccine. Mammogram ordered and scheduled for Mammovan on 9/13/2023. Pap smear completed today.

## 2023-07-12 NOTE — ASSESSMENT & PLAN NOTE
Sounds to be fairly stable. Brother helps manage her finances. Denies difficulties remembering to take medication.   Has appointment to see Dr. Tiffanie Ward again in August.

## 2023-07-12 NOTE — ASSESSMENT & PLAN NOTE
Has follow-up appointment with Dr. Christel Salas in August.  Was referred to [facility] at her last appointment. Seems to be doing well.

## 2023-07-13 LAB — BACTERIA UR CULT: NORMAL

## 2023-08-11 PROBLEM — Z00.00 WELL ADULT EXAM: Status: RESOLVED | Noted: 2018-09-10 | Resolved: 2023-08-11

## 2023-08-11 PROBLEM — Z12.4 CERVICAL CANCER SCREENING: Status: RESOLVED | Noted: 2023-07-12 | Resolved: 2023-08-11

## 2023-09-13 ENCOUNTER — OFFICE VISIT (OUTPATIENT)
Dept: INTERNAL MEDICINE CLINIC | Age: 63
End: 2023-09-13

## 2023-09-13 ENCOUNTER — HOSPITAL ENCOUNTER (OUTPATIENT)
Dept: MAMMOGRAPHY | Age: 63
Discharge: HOME | End: 2023-09-13
Payer: COMMERCIAL

## 2023-09-13 VITALS
OXYGEN SATURATION: 98 % | BODY MASS INDEX: 20.87 KG/M2 | WEIGHT: 145.8 LBS | SYSTOLIC BLOOD PRESSURE: 100 MMHG | HEART RATE: 68 BPM | HEIGHT: 70 IN | DIASTOLIC BLOOD PRESSURE: 62 MMHG

## 2023-09-13 VITALS — BODY MASS INDEX: 20.76 KG/M2 | WEIGHT: 145 LBS | HEIGHT: 70 IN

## 2023-09-13 DIAGNOSIS — Z00.00 WELL ADULT EXAM: ICD-10-CM

## 2023-09-13 DIAGNOSIS — E53.8 LOW FOLIC ACID: ICD-10-CM

## 2023-09-13 DIAGNOSIS — Z78.0 MENOPAUSE: ICD-10-CM

## 2023-09-13 DIAGNOSIS — E55.9 VITAMIN D INSUFFICIENCY: ICD-10-CM

## 2023-09-13 DIAGNOSIS — G31.84 MILD COGNITIVE IMPAIRMENT: ICD-10-CM

## 2023-09-13 DIAGNOSIS — F32.5 MAJOR DEPRESSIVE DISORDER WITH SINGLE EPISODE, IN FULL REMISSION (HCC): ICD-10-CM

## 2023-09-13 DIAGNOSIS — Z12.31 VISIT FOR SCREENING MAMMOGRAM: ICD-10-CM

## 2023-09-13 DIAGNOSIS — F32.5 MAJOR DEPRESSIVE DISORDER WITH SINGLE EPISODE, IN FULL REMISSION (HCC): Primary | ICD-10-CM

## 2023-09-13 DIAGNOSIS — K90.0 CELIAC SPRUE: ICD-10-CM

## 2023-09-13 LAB
BASOPHILS # BLD: 0.1 K/UL (ref 0–0.2)
BASOPHILS NFR BLD: 1.5 %
DEPRECATED RDW RBC AUTO: 14.6 % (ref 12.4–15.4)
EOSINOPHIL # BLD: 0.3 K/UL (ref 0–0.6)
EOSINOPHIL NFR BLD: 3.9 %
HCT VFR BLD AUTO: 38.4 % (ref 36–48)
HGB BLD-MCNC: 12.7 G/DL (ref 12–16)
LYMPHOCYTES # BLD: 2 K/UL (ref 1–5.1)
LYMPHOCYTES NFR BLD: 30.7 %
MCH RBC QN AUTO: 30.9 PG (ref 26–34)
MCHC RBC AUTO-ENTMCNC: 33 G/DL (ref 31–36)
MCV RBC AUTO: 93.7 FL (ref 80–100)
MONOCYTES # BLD: 0.7 K/UL (ref 0–1.3)
MONOCYTES NFR BLD: 11.2 %
NEUTROPHILS # BLD: 3.5 K/UL (ref 1.7–7.7)
NEUTROPHILS NFR BLD: 52.7 %
PLATELET # BLD AUTO: 314 K/UL (ref 135–450)
PMV BLD AUTO: 10 FL (ref 5–10.5)
RBC # BLD AUTO: 4.1 M/UL (ref 4–5.2)
WBC # BLD AUTO: 6.6 K/UL (ref 4–11)

## 2023-09-13 PROCEDURE — 77067 SCR MAMMO BI INCL CAD: CPT

## 2023-09-13 RX ORDER — FLUOXETINE HYDROCHLORIDE 20 MG/1
20 CAPSULE ORAL DAILY
Qty: 90 CAPSULE | Refills: 3 | Status: SHIPPED | OUTPATIENT
Start: 2023-09-13

## 2023-09-13 NOTE — PROGRESS NOTES
Concern    Not on file   Social History Narrative    Not on file     Social Determinants of Health     Financial Resource Strain: Low Risk  (4/3/2023)    Overall Financial Resource Strain (CARDIA)     Difficulty of Paying Living Expenses: Not hard at all   Food Insecurity: No Food Insecurity (4/3/2023)    Hunger Vital Sign     Worried About Running Out of Food in the Last Year: Never true     Ran Out of Food in the Last Year: Never true   Transportation Needs: Unknown (4/3/2023)    PRAPARE - Transportation     Lack of Transportation (Medical): Not on file     Lack of Transportation (Non-Medical): No   Physical Activity: Not on file   Stress: Not on file   Social Connections: Not on file   Intimate Partner Violence: Not on file   Housing Stability: Unknown (4/3/2023)    Housing Stability Vital Sign     Unable to Pay for Housing in the Last Year: Not on file     Number of [address] in the Last Year: Not on file     Unstable Housing in the Last Year: No     Family History   Problem Relation Age of Onset    High Blood Pressure Father     Diabetes Father     Alzheimer's Disease Mother     Bipolar Disorder Brother         Outpatient Medications Prior to Visit   Medication Sig Dispense Refill    FLUoxetine (PROZAC) 20 MG capsule TAKE ONE CAPSULE BY MOUTH DAILY 90 capsule 1    Cyanocobalamin (VITAMIN B-12) 1000 MCG SUBL PLACE ONE TABLET UNDER THE TONGUE DAILY (Patient not taking: Reported on 9/13/2023) 90 tablet 3    folic acid (FOLVITE) 1 MG tablet Take 1 tablet by mouth daily (Patient not taking: Reported on 9/13/2023) 90 tablet 3     No facility-administered medications prior to visit. Patient'spast medical history, surgical history, family history, medications,  and allergies  were all reviewed and updated as appropriate today.     Review of Systems    /62 (Site: Left Upper Arm, Position: Sitting, Cuff Size: Medium Adult)   Pulse 68   Ht 5' 10\" (1.778 m)   Wt 145 lb 12.8 oz (66.1 kg)   SpO2 98%   BMI

## 2023-09-14 LAB
25(OH)D3 SERPL-MCNC: 42.4 NG/ML
ALBUMIN SERPL-MCNC: 4.6 G/DL (ref 3.4–5)
ALBUMIN/GLOB SERPL: 1.8 {RATIO} (ref 1.1–2.2)
ALP SERPL-CCNC: 65 U/L (ref 40–129)
ALT SERPL-CCNC: 9 U/L (ref 10–40)
ANION GAP SERPL CALCULATED.3IONS-SCNC: 12 MMOL/L (ref 3–16)
AST SERPL-CCNC: 17 U/L (ref 15–37)
BILIRUB SERPL-MCNC: 0.3 MG/DL (ref 0–1)
BUN SERPL-MCNC: 14 MG/DL (ref 7–20)
CALCIUM SERPL-MCNC: 9.6 MG/DL (ref 8.3–10.6)
CHLORIDE SERPL-SCNC: 105 MMOL/L (ref 99–110)
CHOLEST SERPL-MCNC: 169 MG/DL (ref 0–199)
CO2 SERPL-SCNC: 24 MMOL/L (ref 21–32)
CREAT SERPL-MCNC: 0.8 MG/DL (ref 0.6–1.2)
FOLATE SERPL-MCNC: 5.7 NG/ML (ref 4.78–24.2)
GFR SERPLBLD CREATININE-BSD FMLA CKD-EPI: >60 ML/MIN/{1.73_M2}
GLUCOSE SERPL-MCNC: 87 MG/DL (ref 70–99)
HDLC SERPL-MCNC: 52 MG/DL (ref 40–60)
LDLC SERPL CALC-MCNC: 99 MG/DL
POTASSIUM SERPL-SCNC: 4.5 MMOL/L (ref 3.5–5.1)
PROT SERPL-MCNC: 7.2 G/DL (ref 6.4–8.2)
SODIUM SERPL-SCNC: 141 MMOL/L (ref 136–145)
TRIGL SERPL-MCNC: 92 MG/DL (ref 0–150)
TSH SERPL DL<=0.005 MIU/L-ACNC: 1.5 UIU/ML (ref 0.27–4.2)
VIT B12 SERPL-MCNC: 1118 PG/ML (ref 211–911)
VLDLC SERPL CALC-MCNC: 18 MG/DL

## 2023-09-15 DIAGNOSIS — K90.0 CELIAC SPRUE: ICD-10-CM

## 2023-09-15 RX ORDER — FOLIC ACID 1 MG/1
1 TABLET ORAL DAILY
Qty: 90 TABLET | Refills: 3 | Status: SHIPPED | OUTPATIENT
Start: 2023-09-15

## 2023-12-11 ENCOUNTER — TELEPHONE (OUTPATIENT)
Dept: INTERNAL MEDICINE CLINIC | Age: 63
End: 2023-12-11

## 2023-12-11 DIAGNOSIS — K90.0 CELIAC SPRUE: ICD-10-CM

## 2023-12-11 DIAGNOSIS — F32.5 MAJOR DEPRESSIVE DISORDER WITH SINGLE EPISODE, IN FULL REMISSION (HCC): Primary | ICD-10-CM

## 2023-12-11 RX ORDER — FOLIC ACID 1 MG/1
1 TABLET ORAL DAILY
Qty: 90 TABLET | Refills: 3 | Status: SHIPPED | OUTPATIENT
Start: 2023-12-11

## 2023-12-11 RX ORDER — FLUOXETINE HYDROCHLORIDE 20 MG/1
20 CAPSULE ORAL DAILY
Qty: 90 CAPSULE | Refills: 3 | Status: SHIPPED | OUTPATIENT
Start: 2023-12-11

## 2023-12-11 NOTE — TELEPHONE ENCOUNTER
Pt  calling requesting refill of Folic Acid ([birth year]) and Fluoxetine (9/13/23)     Last written see above  Last OV 9/13/23  Next OV 1/17/24  Last recommended OV NA     Please send to [phone]

## 2024-01-14 NOTE — PROGRESS NOTES
Patient: Nae Rosenbaum is a 63 y.o. female who presents today with the following Chief Complaint(s):  Chief Complaint   Patient presents with    Check-Up     4 mth vadim        HPI    Here today for follow up.     States that she is doing very well and that we do not need to call her brother for her appointment today.     Is doing well personally and professionally. Is still working at [employer] in the [place] department.     Is no longer planning on moving to [place] to live near her son. Is now planning on moving to [place] to be near her siblings when it is time to move.     Up to date on flu vaccine.     Continues to take B-12 and folic acid. Prozac is working well for her.     Mammogram in September.     Colonoscopy 2018, normal. Due for f/u in 10 years.     Results for orders placed or performed in visit on 09/13/23   CBC with Auto Differential   Result Value Ref Range    WBC 6.6 4.0 - 11.0 K/uL    RBC 4.10 4.00 - 5.20 M/uL    Hemoglobin 12.7 12.0 - 16.0 g/dL    Hematocrit 38.4 36.0 - 48.0 %    MCV 93.7 80.0 - 100.0 fL    MCH 30.9 26.0 - 34.0 pg    MCHC 33.0 31.0 - 36.0 g/dL    RDW 14.6 12.4 - 15.4 %    Platelets 314 135 - 450 K/uL    MPV 10.0 5.0 - 10.5 fL    Neutrophils % 52.7 %    Lymphocytes % 30.7 %    Monocytes % 11.2 %    Eosinophils % 3.9 %    Basophils % 1.5 %    Neutrophils Absolute 3.5 1.7 - 7.7 K/uL    Lymphocytes Absolute 2.0 1.0 - 5.1 K/uL    Monocytes Absolute 0.7 0.0 - 1.3 K/uL    Eosinophils Absolute 0.3 0.0 - 0.6 K/uL    Basophils Absolute 0.1 0.0 - 0.2 K/uL   Lipid Panel   Result Value Ref Range    Cholesterol, Total 169 0 - 199 mg/dL    Triglycerides 92 0 - 150 mg/dL    HDL 52 40 - 60 mg/dL    LDL Calculated 99 <100 mg/dL    VLDL Cholesterol Calculated 18 Not Established mg/dL   Comprehensive Metabolic Panel   Result Value Ref Range    Sodium 141 136 - 145 mmol/L    Potassium 4.5 3.5 - 5.1 mmol/L    Chloride 105 99 - 110 mmol/L    CO2 24 21 - 32 mmol/L    Anion Gap 12 3 - 16    Glucose 87 70 - 99 mg/dL

## 2024-01-17 ENCOUNTER — OFFICE VISIT (OUTPATIENT)
Dept: INTERNAL MEDICINE CLINIC | Age: 64
End: 2024-01-17
Payer: COMMERCIAL

## 2024-01-17 VITALS
BODY MASS INDEX: 20.35 KG/M2 | SYSTOLIC BLOOD PRESSURE: 112 MMHG | WEIGHT: 141.8 LBS | HEART RATE: 64 BPM | DIASTOLIC BLOOD PRESSURE: 80 MMHG

## 2024-01-17 DIAGNOSIS — K90.0 CELIAC SPRUE: ICD-10-CM

## 2024-01-17 DIAGNOSIS — G31.84 MILD COGNITIVE IMPAIRMENT: ICD-10-CM

## 2024-01-17 DIAGNOSIS — F32.5 MAJOR DEPRESSIVE DISORDER WITH SINGLE EPISODE, IN FULL REMISSION (HCC): Primary | ICD-10-CM

## 2024-01-17 DIAGNOSIS — F60.9 PERSONALITY DISORDER (HCC): ICD-10-CM

## 2024-01-17 PROCEDURE — 99214 OFFICE O/P EST MOD 30 MIN: CPT | Performed by: INTERNAL MEDICINE

## 2024-01-17 RX ORDER — MAGNESIUM 200 MG
1000 TABLET ORAL DAILY
Qty: 90 TABLET | Refills: 3 | Status: SHIPPED | OUTPATIENT
Start: 2024-01-17

## 2024-01-17 ASSESSMENT — PATIENT HEALTH QUESTIONNAIRE - PHQ9
SUM OF ALL RESPONSES TO PHQ QUESTIONS 1-9: 0
4. FEELING TIRED OR HAVING LITTLE ENERGY: 0
1. LITTLE INTEREST OR PLEASURE IN DOING THINGS: 0
SUM OF ALL RESPONSES TO PHQ QUESTIONS 1-9: 0
5. POOR APPETITE OR OVEREATING: 0
SUM OF ALL RESPONSES TO PHQ QUESTIONS 1-9: 0
7. TROUBLE CONCENTRATING ON THINGS, SUCH AS READING THE NEWSPAPER OR WATCHING TELEVISION: 0
10. IF YOU CHECKED OFF ANY PROBLEMS, HOW DIFFICULT HAVE THESE PROBLEMS MADE IT FOR YOU TO DO YOUR WORK, TAKE CARE OF THINGS AT HOME, OR GET ALONG WITH OTHER PEOPLE: 0
2. FEELING DOWN, DEPRESSED OR HOPELESS: 0
9. THOUGHTS THAT YOU WOULD BE BETTER OFF DEAD, OR OF HURTING YOURSELF: 0
6. FEELING BAD ABOUT YOURSELF - OR THAT YOU ARE A FAILURE OR HAVE LET YOURSELF OR YOUR FAMILY DOWN: 0
3. TROUBLE FALLING OR STAYING ASLEEP: 0
8. MOVING OR SPEAKING SO SLOWLY THAT OTHER PEOPLE COULD HAVE NOTICED. OR THE OPPOSITE, BEING SO FIGETY OR RESTLESS THAT YOU HAVE BEEN MOVING AROUND A LOT MORE THAN USUAL: 0
SUM OF ALL RESPONSES TO PHQ9 QUESTIONS 1 & 2: 0
SUM OF ALL RESPONSES TO PHQ QUESTIONS 1-9: 0

## 2024-01-17 NOTE — ASSESSMENT & PLAN NOTE
Has been referred to counseling for DBT per recommendations of Dr. Magaña but she did not make the appointments.  Patient is overdue for follow-up with Dr. Magaña-I do not think that she went in August.  Encouraged to make follow-up appointment for March which would be 1 year since her initial evaluation.

## 2024-01-17 NOTE — ASSESSMENT & PLAN NOTE
Sounds to be doing well overall.  States that her brother, Frank Rosenbaum, helps manage her finances.  She has been working at [employer] for about 1 year now and is enjoying her work and doing well.  She is considering moving to [place] to be near her siblings when the time comes to move.  She is overdue to follow-up with Dr. Magaña and I have encouraged her to make that appointment.  We did discuss today that he will help with the diagnosis of her memory impairments which will help with future planning.  I did offer to call patient's brother, Frank Rosenbaum, during the appointment today.  Nae stated that she did not feel like she needed to include her brother and her appointment today.

## 2024-01-17 NOTE — PATIENT INSTRUCTIONS
Tyson Magaña, PhD  [address] [place] [address]  [phone]    You need to make an appointment to see him in March for a follow up.

## 2024-05-19 SDOH — ECONOMIC STABILITY: FOOD INSECURITY: WITHIN THE PAST 12 MONTHS, YOU WORRIED THAT YOUR FOOD WOULD RUN OUT BEFORE YOU GOT MONEY TO BUY MORE.: NEVER TRUE

## 2024-05-19 SDOH — ECONOMIC STABILITY: FOOD INSECURITY: WITHIN THE PAST 12 MONTHS, THE FOOD YOU BOUGHT JUST DIDN'T LAST AND YOU DIDN'T HAVE MONEY TO GET MORE.: NEVER TRUE

## 2024-05-19 SDOH — ECONOMIC STABILITY: TRANSPORTATION INSECURITY
IN THE PAST 12 MONTHS, HAS LACK OF TRANSPORTATION KEPT YOU FROM MEETINGS, WORK, OR FROM GETTING THINGS NEEDED FOR DAILY LIVING?: NO

## 2024-05-19 SDOH — ECONOMIC STABILITY: INCOME INSECURITY: HOW HARD IS IT FOR YOU TO PAY FOR THE VERY BASICS LIKE FOOD, HOUSING, MEDICAL CARE, AND HEATING?: NOT HARD AT ALL

## 2024-05-22 ENCOUNTER — OFFICE VISIT (OUTPATIENT)
Dept: INTERNAL MEDICINE CLINIC | Age: 64
End: 2024-05-22
Payer: COMMERCIAL

## 2024-05-22 VITALS
HEART RATE: 63 BPM | SYSTOLIC BLOOD PRESSURE: 108 MMHG | DIASTOLIC BLOOD PRESSURE: 74 MMHG | BODY MASS INDEX: 19.28 KG/M2 | OXYGEN SATURATION: 98 % | WEIGHT: 134.4 LBS

## 2024-05-22 DIAGNOSIS — E55.9 VITAMIN D INSUFFICIENCY: ICD-10-CM

## 2024-05-22 DIAGNOSIS — Z11.3 ROUTINE SCREENING FOR STI (SEXUALLY TRANSMITTED INFECTION): ICD-10-CM

## 2024-05-22 DIAGNOSIS — Z11.59 NEED FOR HEPATITIS B SCREENING TEST: ICD-10-CM

## 2024-05-22 DIAGNOSIS — Z11.59 NEED FOR HEPATITIS C SCREENING TEST: ICD-10-CM

## 2024-05-22 DIAGNOSIS — R63.4 WEIGHT LOSS: ICD-10-CM

## 2024-05-22 DIAGNOSIS — F60.9 PERSONALITY DISORDER (HCC): ICD-10-CM

## 2024-05-22 DIAGNOSIS — K90.0 CELIAC SPRUE: ICD-10-CM

## 2024-05-22 DIAGNOSIS — G31.84 MILD COGNITIVE IMPAIRMENT: Primary | ICD-10-CM

## 2024-05-22 DIAGNOSIS — F32.5 MAJOR DEPRESSIVE DISORDER WITH SINGLE EPISODE, IN FULL REMISSION (HCC): ICD-10-CM

## 2024-05-22 LAB
BASOPHILS # BLD: 0.1 K/UL (ref 0–0.2)
BASOPHILS NFR BLD: 1.2 %
DEPRECATED RDW RBC AUTO: 15.2 % (ref 12.4–15.4)
EOSINOPHIL # BLD: 0.5 K/UL (ref 0–0.6)
EOSINOPHIL NFR BLD: 5.3 %
HBV SURFACE AB SERPL IA-ACNC: <3.5 MIU/ML
HBV SURFACE AG SERPL QL IA: NORMAL
HCT VFR BLD AUTO: 39.7 % (ref 36–48)
HGB BLD-MCNC: 13 G/DL (ref 12–16)
LYMPHOCYTES # BLD: 1.6 K/UL (ref 1–5.1)
LYMPHOCYTES NFR BLD: 19.2 %
MCH RBC QN AUTO: 30.9 PG (ref 26–34)
MCHC RBC AUTO-ENTMCNC: 32.9 G/DL (ref 31–36)
MCV RBC AUTO: 93.9 FL (ref 80–100)
MONOCYTES # BLD: 0.8 K/UL (ref 0–1.3)
MONOCYTES NFR BLD: 9 %
NEUTROPHILS # BLD: 5.6 K/UL (ref 1.7–7.7)
NEUTROPHILS NFR BLD: 65.3 %
PLATELET # BLD AUTO: 349 K/UL (ref 135–450)
PMV BLD AUTO: 9.4 FL (ref 5–10.5)
RBC # BLD AUTO: 4.22 M/UL (ref 4–5.2)
WBC # BLD AUTO: 8.5 K/UL (ref 4–11)

## 2024-05-22 PROCEDURE — 99215 OFFICE O/P EST HI 40 MIN: CPT | Performed by: INTERNAL MEDICINE

## 2024-05-22 NOTE — PROGRESS NOTES
CT of chest, abdomen, and pelvis.  Patient does have a 30-pack-year smoking history.         Relevant Orders    TSH with Reflex (Completed)    CT CHEST WO CONTRAST    CT ABDOMEN PELVIS WO CONTRAST Additional Contrast? Radiologist Recommendation    Routine screening for STI (sexually transmitted infection)      Patient recently became intermittent with a new partner.  Check urine for chlamydia and gonorrhea, syphilis, HIV, hepatitis C, hepatitis B.         Relevant Orders    Hepatitis C Antibody (Completed)    Hepatitis B Surface Antigen (Completed)    Hepatitis B Surface Antibody (Completed)    HIV Screen (Completed)    Syphilis Antibody Cascading Reflex (Completed)    C.trachomatis N.gonorrhoeae DNA, Urine (Completed)     Other Visit Diagnoses       Need for hepatitis B screening test        Relevant Orders    Hepatitis B Surface Antigen (Completed)    Hepatitis B Surface Antibody (Completed)    Need for hepatitis C screening test        Relevant Orders    Hepatitis C Antibody (Completed)            Current Outpatient Medications   Medication Sig Dispense Refill    Cyanocobalamin (VITAMIN B-12) 1000 MCG SUBL Place 1,000 mcg under the tongue daily 90 tablet 3    folic acid (FOLVITE) 1 MG tablet Take 1 tablet by mouth daily 90 tablet 3    FLUoxetine (PROZAC) 20 MG capsule Take 1 capsule by mouth daily 90 capsule 3     No current facility-administered medications for this visit.       Return in about 4 weeks (around 6/19/2024) for results.    40 minutes spent in face-to-face encounter and in chart review on 5/22/2024.

## 2024-05-23 LAB
25(OH)D3 SERPL-MCNC: 81.3 NG/ML
ALBUMIN SERPL-MCNC: 4.7 G/DL (ref 3.4–5)
ALBUMIN/GLOB SERPL: 1.7 {RATIO} (ref 1.1–2.2)
ALP SERPL-CCNC: 63 U/L (ref 40–129)
ALT SERPL-CCNC: 9 U/L (ref 10–40)
ANION GAP SERPL CALCULATED.3IONS-SCNC: 13 MMOL/L (ref 3–16)
AST SERPL-CCNC: 16 U/L (ref 15–37)
BILIRUB SERPL-MCNC: 0.4 MG/DL (ref 0–1)
BUN SERPL-MCNC: 14 MG/DL (ref 7–20)
CALCIUM SERPL-MCNC: 10 MG/DL (ref 8.3–10.6)
CHLORIDE SERPL-SCNC: 103 MMOL/L (ref 99–110)
CO2 SERPL-SCNC: 25 MMOL/L (ref 21–32)
CREAT SERPL-MCNC: 0.7 MG/DL (ref 0.6–1.2)
GFR SERPLBLD CREATININE-BSD FMLA CKD-EPI: >90 ML/MIN/{1.73_M2}
GLUCOSE SERPL-MCNC: 93 MG/DL (ref 70–99)
HCV AB SERPL QL IA: NORMAL
HIV 1+2 AB+HIV1 P24 AG SERPL QL IA: NORMAL
HIV 2 AB SERPL QL IA: NORMAL
HIV1 AB SERPL QL IA: NORMAL
HIV1 P24 AG SERPL QL IA: NORMAL
IGA SERPL-MCNC: 175 MG/DL (ref 70–400)
POTASSIUM SERPL-SCNC: 4.6 MMOL/L (ref 3.5–5.1)
PROT SERPL-MCNC: 7.5 G/DL (ref 6.4–8.2)
REAGIN+T PALLIDUM IGG+IGM SERPL-IMP: NORMAL
SODIUM SERPL-SCNC: 141 MMOL/L (ref 136–145)
TISSUE TRANSGLUTAMINASE IGA: <0.5 U/ML (ref 0–14)
TSH SERPL DL<=0.005 MIU/L-ACNC: 1.35 UIU/ML (ref 0.27–4.2)

## 2024-05-24 LAB
C TRACH DNA UR QL NAA+PROBE: NEGATIVE
N GONORRHOEA DNA UR QL NAA+PROBE: NEGATIVE

## 2024-05-25 PROBLEM — Z11.3 ROUTINE SCREENING FOR STI (SEXUALLY TRANSMITTED INFECTION): Status: ACTIVE | Noted: 2024-05-25

## 2024-05-25 PROBLEM — R63.4 WEIGHT LOSS: Status: ACTIVE | Noted: 2024-05-25

## 2024-05-25 NOTE — ASSESSMENT & PLAN NOTE
Patient recently became intermittent with a new partner.  Check urine for chlamydia and gonorrhea, syphilis, HIV, hepatitis C, hepatitis B.

## 2024-05-26 NOTE — ASSESSMENT & PLAN NOTE
Patient has lost 10 pounds in the past year.  She has known celiac disease.  Check antibodies to rule out disease activity.  Check CT of chest, abdomen, and pelvis.  Patient does have a 30-pack-year smoking history.

## 2024-05-29 ENCOUNTER — TELEPHONE (OUTPATIENT)
Dept: INTERNAL MEDICINE CLINIC | Age: 64
End: 2024-05-29

## 2024-06-06 ENCOUNTER — TELEPHONE (OUTPATIENT)
Dept: INTERNAL MEDICINE CLINIC | Age: 64
End: 2024-06-06

## 2024-06-10 ENCOUNTER — TELEPHONE (OUTPATIENT)
Dept: INTERNAL MEDICINE CLINIC | Age: 64
End: 2024-06-10

## 2024-06-10 NOTE — TELEPHONE ENCOUNTER
Jacob from [facility] calling requesting that Pts records be faxed over.     Fax Number- [phone]

## 2024-06-24 PROBLEM — Z11.3 ROUTINE SCREENING FOR STI (SEXUALLY TRANSMITTED INFECTION): Status: RESOLVED | Noted: 2024-05-25 | Resolved: 2024-06-24

## 2024-09-11 ENCOUNTER — TELEPHONE (OUTPATIENT)
Dept: INTERNAL MEDICINE CLINIC | Age: 64
End: 2024-09-11

## 2024-09-20 ENCOUNTER — TELEPHONE (OUTPATIENT)
Dept: INTERNAL MEDICINE CLINIC | Age: 64
End: 2024-09-20

## 2024-11-07 ENCOUNTER — TELEPHONE (OUTPATIENT)
Dept: INTERNAL MEDICINE CLINIC | Age: 64
End: 2024-11-07

## 2024-11-07 NOTE — TELEPHONE ENCOUNTER
Pt's brother Gigi calling. Would like to discuss pt's current condition and get her scheduled with Dr. Patel. Please advise and call Gigi.

## 2024-11-11 NOTE — TELEPHONE ENCOUNTER
Spoke with patient's brother. Pt has lost her job. She is have urinary issues. The brother is concerned about her. Dr. Patel wants to see the patient Friday at 1:20. I have called the patient several times today and had to leave messages. I did let the brother know that I have no been able to reach the patient. He is going to contact her and I will call her again in the morning.

## 2024-11-15 ENCOUNTER — OFFICE VISIT (OUTPATIENT)
Dept: INTERNAL MEDICINE CLINIC | Age: 64
End: 2024-11-15
Payer: COMMERCIAL

## 2024-11-15 VITALS
WEIGHT: 136.8 LBS | BODY MASS INDEX: 19.63 KG/M2 | SYSTOLIC BLOOD PRESSURE: 90 MMHG | HEART RATE: 66 BPM | DIASTOLIC BLOOD PRESSURE: 64 MMHG | OXYGEN SATURATION: 97 %

## 2024-11-15 DIAGNOSIS — F60.9 PERSONALITY DISORDER (HCC): ICD-10-CM

## 2024-11-15 DIAGNOSIS — K90.0 CELIAC SPRUE: ICD-10-CM

## 2024-11-15 DIAGNOSIS — E55.9 VITAMIN D INSUFFICIENCY: ICD-10-CM

## 2024-11-15 DIAGNOSIS — E53.8 LOW FOLIC ACID: ICD-10-CM

## 2024-11-15 DIAGNOSIS — Z13.220 LIPID SCREENING: ICD-10-CM

## 2024-11-15 DIAGNOSIS — G31.84 MILD COGNITIVE IMPAIRMENT: Primary | ICD-10-CM

## 2024-11-15 DIAGNOSIS — F32.5 MAJOR DEPRESSIVE DISORDER WITH SINGLE EPISODE, IN FULL REMISSION (HCC): ICD-10-CM

## 2024-11-15 PROCEDURE — 90750 HZV VACC RECOMBINANT IM: CPT | Performed by: INTERNAL MEDICINE

## 2024-11-15 PROCEDURE — 99214 OFFICE O/P EST MOD 30 MIN: CPT | Performed by: INTERNAL MEDICINE

## 2024-11-15 PROCEDURE — 90471 IMMUNIZATION ADMIN: CPT | Performed by: INTERNAL MEDICINE

## 2024-11-15 NOTE — PATIENT INSTRUCTIONS
Neurology- Dr. Dalila Simpson  [address] [place] [address]  11/27/24 at 8:30 am (Leave your home at 7 am).    Limit driving to local areas that are familiar to you.     For urinary incontinence:  - try to go to the bathroom to urinate every 2-3 hours, regardless if you feel the need.   - try wearing Thinx or Knix underwear. These are brands of absorbant underwear that will protect your clothing if you do have a leak.

## 2024-11-15 NOTE — PROGRESS NOTES
Patient: Nae Rosenbaum is a 64 y.o. female who presents today with the following Chief Complaint(s):  Chief Complaint   Patient presents with    Check-Up     Slums 23       HPI    Here today for follow up. Brother Frank is on the phone with her.     Lost her job at [employer]. Was told that she had \"some issues\" with attendance and did \"steal something\" (deodorant).   - was either not showing up on time or left early.   - states that she was working in the [employer] and was sweating and thought that she needed to use deodorant so took it and used it.   - also took sushi from [employer].   - brother states that there were other instances where she took stuff. Patient \"does not recall\".     Has been smoking mariajuana. Has been smoking marijuana for decades.     Frank states that he has not noticed distinct personality changes. Did notice decreased affect. Felt like she was just telling him things that he wanted to hear (not new).     Has appointment with neurology 11/27/24.   -  Neurology.     Is dating a man, Lucien Hamilton, who will be able to accompany her to her neurology appointment in 2 weeks.   - will make sure that her boyfriend drives her to this appointment.     Denies \"close calls\" with driving \"recently\".  - does not like to drive at night.   - does not recall any \"close calls\".   - uses GPS.   - used GPS to get home from airport and got lost. Boyfriend was able to help her get home.  - went to our old address instead of here. Has been here before.     Has been having issues with urinary and fecal incontinence.   - has happened \"a couple of times\".   - 1 episode happened at work when she had to urinate and \"no one would come out to help me so I went to the back corner of the garden center and peed\".   - having urgency- will feel like she needs to go to the bathroom and will not be able to get to the bathroom in time.   - occasionally.     Has had 2 episodes of fecal incontinence  - both were normal stools.  - just

## 2024-11-16 NOTE — ASSESSMENT & PLAN NOTE
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  SLUMS has been gradually decreasing over the past 2 years- 28/30 10/3/22 -> 25/30 5/22/24 -> 23/30 today.  She has also exhibited poor judgement: including shoplifting several times at work, urinating in the garden center while at work because she did not feel she could leave to use the restroom, leaving work early (questionable if she let a manager know that she was leaving early or not). She lost her job at [employer] as a result of lapses in judgement. 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   Concern for possible frontal temporal lobe dementia.  Has upcoming appointment with neurology 11/27/2024.  Patient advised that she should only drive locally and during the day in familiar places.  Patient will bring her boyfriend with her to her neurology appointment-he will drive to that appointment.  Patient advised that she should be further 8:30 AM neurology appointment in [place], [place] at 7 AM.

## 2024-11-16 NOTE — ASSESSMENT & PLAN NOTE
Vitamin B12, folic acid, vitamin D, celiac antibodies.  Patient reports following a gluten-free diet and denies issues with diarrhea.  Celiac studies were negative in May 2024.

## 2024-11-16 NOTE — ASSESSMENT & PLAN NOTE
Diagnosed with personality disorder per Dr. Magaña's report in 2023.  Recommendations were made for counseling but she did not follow through.  Patient is also noted to have some personnel changes/lapses in judgment including shoplifting at work, urinating in the garden center while at work because she did not feel she could leave to use the restroom, leaving work early (questionable if she let a manager know that she was leaving early or not).  Concern for possible frontal temporal lobe dementia.  Has upcoming appointment with neurology 11/27/2024.

## 2024-12-13 DIAGNOSIS — K90.0 CELIAC SPRUE: ICD-10-CM

## 2024-12-13 DIAGNOSIS — F32.5 MAJOR DEPRESSIVE DISORDER WITH SINGLE EPISODE, IN FULL REMISSION (HCC): ICD-10-CM

## 2024-12-13 RX ORDER — FOLIC ACID 1 MG/1
1 TABLET ORAL DAILY
Qty: 90 TABLET | Refills: 3 | Status: SHIPPED | OUTPATIENT
Start: 2024-12-13

## 2025-01-13 ENCOUNTER — OFFICE VISIT (OUTPATIENT)
Dept: INTERNAL MEDICINE CLINIC | Age: 65
End: 2025-01-13
Payer: COMMERCIAL

## 2025-01-13 VITALS
WEIGHT: 132.4 LBS | SYSTOLIC BLOOD PRESSURE: 108 MMHG | BODY MASS INDEX: 18.96 KG/M2 | DIASTOLIC BLOOD PRESSURE: 60 MMHG | HEIGHT: 70 IN | OXYGEN SATURATION: 97 % | HEART RATE: 66 BPM

## 2025-01-13 DIAGNOSIS — F32.5 MAJOR DEPRESSIVE DISORDER WITH SINGLE EPISODE, IN FULL REMISSION (HCC): Primary | ICD-10-CM

## 2025-01-13 DIAGNOSIS — F60.9 PERSONALITY DISORDER (HCC): ICD-10-CM

## 2025-01-13 DIAGNOSIS — R73.9 HYPERGLYCEMIA: ICD-10-CM

## 2025-01-13 DIAGNOSIS — G31.84 MILD COGNITIVE IMPAIRMENT: ICD-10-CM

## 2025-01-13 PROCEDURE — 90471 IMMUNIZATION ADMIN: CPT | Performed by: INTERNAL MEDICINE

## 2025-01-13 PROCEDURE — 99214 OFFICE O/P EST MOD 30 MIN: CPT | Performed by: INTERNAL MEDICINE

## 2025-01-13 PROCEDURE — 90750 HZV VACC RECOMBINANT IM: CPT | Performed by: INTERNAL MEDICINE

## 2025-01-13 RX ORDER — CALCIUM CARBONATE-CHOLECALCIFEROL TAB 250 MG-125 UNIT 250-125 MG-UNIT
TAB ORAL
COMMUNITY

## 2025-01-13 SDOH — ECONOMIC STABILITY: FOOD INSECURITY: WITHIN THE PAST 12 MONTHS, THE FOOD YOU BOUGHT JUST DIDN'T LAST AND YOU DIDN'T HAVE MONEY TO GET MORE.: NEVER TRUE

## 2025-01-13 SDOH — ECONOMIC STABILITY: FOOD INSECURITY: WITHIN THE PAST 12 MONTHS, YOU WORRIED THAT YOUR FOOD WOULD RUN OUT BEFORE YOU GOT MONEY TO BUY MORE.: NEVER TRUE

## 2025-01-13 ASSESSMENT — PATIENT HEALTH QUESTIONNAIRE - PHQ9
1. LITTLE INTEREST OR PLEASURE IN DOING THINGS: NOT AT ALL
SUM OF ALL RESPONSES TO PHQ QUESTIONS 1-9: 0
6. FEELING BAD ABOUT YOURSELF - OR THAT YOU ARE A FAILURE OR HAVE LET YOURSELF OR YOUR FAMILY DOWN: NOT AT ALL
10. IF YOU CHECKED OFF ANY PROBLEMS, HOW DIFFICULT HAVE THESE PROBLEMS MADE IT FOR YOU TO DO YOUR WORK, TAKE CARE OF THINGS AT HOME, OR GET ALONG WITH OTHER PEOPLE: NOT DIFFICULT AT ALL
4. FEELING TIRED OR HAVING LITTLE ENERGY: NOT AT ALL
8. MOVING OR SPEAKING SO SLOWLY THAT OTHER PEOPLE COULD HAVE NOTICED. OR THE OPPOSITE, BEING SO FIGETY OR RESTLESS THAT YOU HAVE BEEN MOVING AROUND A LOT MORE THAN USUAL: NOT AT ALL
7. TROUBLE CONCENTRATING ON THINGS, SUCH AS READING THE NEWSPAPER OR WATCHING TELEVISION: NOT AT ALL
SUM OF ALL RESPONSES TO PHQ9 QUESTIONS 1 & 2: 0
9. THOUGHTS THAT YOU WOULD BE BETTER OFF DEAD, OR OF HURTING YOURSELF: NOT AT ALL
SUM OF ALL RESPONSES TO PHQ QUESTIONS 1-9: 0
SUM OF ALL RESPONSES TO PHQ QUESTIONS 1-9: 0
3. TROUBLE FALLING OR STAYING ASLEEP: NOT AT ALL
5. POOR APPETITE OR OVEREATING: NOT AT ALL
2. FEELING DOWN, DEPRESSED OR HOPELESS: NOT AT ALL
SUM OF ALL RESPONSES TO PHQ QUESTIONS 1-9: 0

## 2025-01-13 NOTE — PATIENT INSTRUCTIONS
Add strength/resistance training x 30 minutes 3 days/week.   - look at Chair Yoga classes on You [employer]ube.    Cut out juice, lemonade, and bottled iced tea.   - try to drink primarily water or flavored water.

## 2025-01-13 NOTE — PROGRESS NOTES
Patient: Nae Rosenbaum is a 64 y.o. female who presents today with the following Chief Complaint(s):  Chief Complaint   Patient presents with    Follow-up     Patient to discuss labs       HPI    Here today for follow up.  Patient's brother, Frank Rosenbaum, is present via phone.    Will get her second shingles vaccine today.     Did see Dr. Pam Simpson at  24. Note reviewed.   - neuropsych eval is scheduled for Friday.   - needs MRI scheduled but needs to be done in-network. [facility] is in-network.   - has f/u on 25.     Labs from 24 reviewed. normal   -  methylmalonic acid 169  - HbA1c 5.5%  -  Folic Acid >24.80  - TSH 1.87  - B-12 988  - Vit D 75      Does eat a lot of sugar.   - drinks a lot of coffee, water, juice, [place] iced tea    Brother Frank is working on managing her finances. Has a credit card with a $500/balance. Frustrating for patient.     Depression wise she is doing well.          Allergies   Allergen Reactions    Gluten Meal Diarrhea      Past Medical History:   Diagnosis Date    Celiac disease/sprue       Past Surgical History:   Procedure Laterality Date    COLONOSCOPY  10/04/2018    MANDIBLE RECONSTRUCTION      MN COLONOSCOPY FLX DX W/COLLJ SPEC WHEN PFRMD N/A 10/4/2018    COLONOSCOPY DIAGNOSTIC OR SCREENING performed by Marielle Acosta MD at [facility] ENDOSCOPY      Social History     Socioeconomic History    Marital status:      Spouse name: Not on file    Number of children: Not on file    Years of education: Not on file    Highest education level: Not on file   Occupational History    Occupation: customer service     Employer: [employer]   Tobacco Use    Smoking status: Former     Current packs/day: 0.00     Average packs/day: 0.5 packs/day for 20.0 years (10.0 ttl pk-yrs)     Types: Cigarettes     Start date: 2018     Quit date: 9/10/2018     Years since quittin.3    Smokeless tobacco: Never   Substance and Sexual Activity    Alcohol use: Yes     Alcohol/week: 3.0

## 2025-01-14 NOTE — ASSESSMENT & PLAN NOTE
Hemoglobin A1c is higher than expected at 5.5%.  Discussed insulin resistance and need to build muscle mass to help with insulin resistance.  Patient also drinks a lot of sugary drinks and I have recommended cutting out juice, lemonade, and bottled iced tea entirely.

## 2025-01-14 NOTE — ASSESSMENT & PLAN NOTE
Patient was diagnosed with a personality disorder per Dr. Magaña's report in 2023.  Recommendations were made from counseling but she did not follow through.  Patient is scheduled to undergo neuropsych testing again on Friday, 1/17/2024.

## 2025-01-14 NOTE — ASSESSMENT & PLAN NOTE
Patient has established with Dr. Simpson at  for neurology.  She is scheduled to undergo neuropsych eval on Friday, 1/17/2024.  MRI needs to be scheduled through [facility].  I have encouraged patient's brother to reach out to Dr. Simpson's office to see what form of media she would like to have to review the MRI since it will be done outside of .  Patient's brother helps manage her finances.

## 2025-03-07 ENCOUNTER — TELEPHONE (OUTPATIENT)
Dept: INTERNAL MEDICINE CLINIC | Age: 65
End: 2025-03-07

## 2025-03-07 NOTE — TELEPHONE ENCOUNTER
Brother Gigi calling in for pt.     Pt has been seen at  Neuro and was ordered a PET scan and a blood test. Gigi was asking about where pt can get her lab done. He was advised she may come here but we will need the paper order since that doc is not a [facility] doc and it won't be in pt's chart. Gigi states pt would not have the order d/t she has some cognition deficits. He was going to see if that office can fax over that order to our office but then he was given the suggestion that if pt is going to have to go to the  main campus for the scan, she may be able to get that lab done there as well. Gigi is going to reach out to  for further assistance.    No further action needed.

## 2025-04-14 ENCOUNTER — HOSPITAL ENCOUNTER (OUTPATIENT)
Age: 65
Discharge: HOME | End: 2025-04-14
Payer: COMMERCIAL

## 2025-04-14 DIAGNOSIS — K90.0 CELIAC SPRUE: ICD-10-CM

## 2025-04-14 DIAGNOSIS — E55.9 VITAMIN D INSUFFICIENCY: ICD-10-CM

## 2025-04-14 DIAGNOSIS — Z13.220 LIPID SCREENING: ICD-10-CM

## 2025-04-14 DIAGNOSIS — G31.84 MILD COGNITIVE IMPAIRMENT: ICD-10-CM

## 2025-04-14 LAB
25(OH)D3 SERPL-MCNC: 58.1 NG/ML
ALBUMIN SERPL-MCNC: 4.1 G/DL (ref 3.4–5)
ALBUMIN/GLOB SERPL: 1.5 {RATIO} (ref 1.1–2.2)
ALP SERPL-CCNC: 60 U/L (ref 40–129)
ALT SERPL-CCNC: 13 U/L (ref 10–40)
ANION GAP SERPL CALCULATED.3IONS-SCNC: 11 MMOL/L (ref 3–16)
AST SERPL-CCNC: 21 U/L (ref 15–37)
BILIRUB SERPL-MCNC: 0.4 MG/DL (ref 0–1)
BUN SERPL-MCNC: 16 MG/DL (ref 7–20)
CALCIUM SERPL-MCNC: 9.4 MG/DL (ref 8.3–10.6)
CHLORIDE SERPL-SCNC: 105 MMOL/L (ref 99–110)
CHOLEST SERPL-MCNC: 153 MG/DL (ref 0–199)
CO2 SERPL-SCNC: 25 MMOL/L (ref 21–32)
CREAT SERPL-MCNC: 1.2 MG/DL (ref 0.6–1.2)
FOLATE SERPL-MCNC: 18.5 NG/ML (ref 4.78–24.2)
GFR SERPLBLD CREATININE-BSD FMLA CKD-EPI: 51 ML/MIN/{1.73_M2}
GLUCOSE SERPL-MCNC: 91 MG/DL (ref 70–99)
HDLC SERPL-MCNC: 50 MG/DL (ref 40–60)
LDLC SERPL CALC-MCNC: 83 MG/DL
MAGNESIUM SERPL-MCNC: 2.14 MG/DL (ref 1.8–2.4)
POTASSIUM SERPL-SCNC: 5.1 MMOL/L (ref 3.5–5.1)
PROT SERPL-MCNC: 6.9 G/DL (ref 6.4–8.2)
SODIUM SERPL-SCNC: 141 MMOL/L (ref 136–145)
TRIGL SERPL-MCNC: 99 MG/DL (ref 0–150)
TSH SERPL DL<=0.005 MIU/L-ACNC: 0.85 UIU/ML (ref 0.27–4.2)
VIT B12 SERPL-MCNC: 998 PG/ML (ref 211–911)
VLDLC SERPL CALC-MCNC: 20 MG/DL

## 2025-04-14 PROCEDURE — 83735 ASSAY OF MAGNESIUM: CPT

## 2025-04-14 PROCEDURE — 82607 VITAMIN B-12: CPT

## 2025-04-14 PROCEDURE — 84443 ASSAY THYROID STIM HORMONE: CPT

## 2025-04-14 PROCEDURE — 36415 COLL VENOUS BLD VENIPUNCTURE: CPT

## 2025-04-14 PROCEDURE — 80061 LIPID PANEL: CPT

## 2025-04-14 PROCEDURE — 82306 VITAMIN D 25 HYDROXY: CPT

## 2025-04-14 PROCEDURE — 82746 ASSAY OF FOLIC ACID SERUM: CPT

## 2025-04-14 PROCEDURE — 80053 COMPREHEN METABOLIC PANEL: CPT

## 2025-04-18 RX ORDER — MAGNESIUM 200 MG
TABLET ORAL
Qty: 90 TABLET | Refills: 1 | Status: SHIPPED | OUTPATIENT
Start: 2025-04-18

## 2025-04-27 NOTE — PROGRESS NOTES
Movements: Extraocular movements intact.      Conjunctiva/sclera: Conjunctivae normal.      Pupils: Pupils are equal, round, and reactive to light.   Neck:      Thyroid: No thyroid mass or thyromegaly.      Vascular: No carotid bruit.   Cardiovascular:      Rate and Rhythm: Normal rate and regular rhythm.      Heart sounds: Normal heart sounds. No murmur heard.  Pulmonary:      Effort: Pulmonary effort is normal.      Breath sounds: Normal breath sounds.   Musculoskeletal:      Right lower leg: No edema.      Left lower leg: No edema.   Lymphadenopathy:      Cervical: No cervical adenopathy.   Neurological:      General: No focal deficit present.      Mental Status: She is alert and oriented to person, place, and time.   Psychiatric:         Mood and Affect: Mood normal.         Behavior: Behavior normal. Behavior is cooperative.             Results  Results for orders placed or performed during the hospital encounter of 04/14/25   Vitamin D 25 Hydroxy   Result Value Ref Range    Vit D, 25-Hydroxy 58.1 >=30 ng/mL   Lipid Panel   Result Value Ref Range    Cholesterol, Total 153 0 - 199 mg/dL    Triglycerides 99 0 - 150 mg/dL    HDL 50 40 - 60 mg/dL    LDL Cholesterol 83 <100 mg/dL    VLDL Cholesterol Calculated 20 Not Established mg/dL   Comprehensive Metabolic Panel   Result Value Ref Range    Sodium 141 136 - 145 mmol/L    Potassium 5.1 3.5 - 5.1 mmol/L    Chloride 105 99 - 110 mmol/L    CO2 25 21 - 32 mmol/L    Anion Gap 11 3 - 16    Glucose 91 70 - 99 mg/dL    BUN 16 7 - 20 mg/dL    Creatinine 1.2 0.6 - 1.2 mg/dL    Est, Glom Filt Rate 51 (A) >60    Calcium 9.4 8.3 - 10.6 mg/dL    Total Protein 6.9 6.4 - 8.2 g/dL    Albumin 4.1 3.4 - 5.0 g/dL    Albumin/Globulin Ratio 1.5 1.1 - 2.2    Total Bilirubin 0.4 0.0 - 1.0 mg/dL    Alkaline Phosphatase 60 40 - 129 U/L    ALT 13 10 - 40 U/L    AST 21 15 - 37 U/L   TSH with Reflex   Result Value Ref Range    TSH Reflex FT4 0.85 0.27 - 4.20 uIU/mL   Magnesium   Result Value

## 2025-04-29 ENCOUNTER — OFFICE VISIT (OUTPATIENT)
Dept: INTERNAL MEDICINE CLINIC | Age: 65
End: 2025-04-29
Payer: COMMERCIAL

## 2025-04-29 VITALS
SYSTOLIC BLOOD PRESSURE: 100 MMHG | BODY MASS INDEX: 20.07 KG/M2 | OXYGEN SATURATION: 97 % | HEIGHT: 70 IN | WEIGHT: 140.2 LBS | HEART RATE: 58 BPM | DIASTOLIC BLOOD PRESSURE: 60 MMHG

## 2025-04-29 DIAGNOSIS — G31.84 MILD COGNITIVE IMPAIRMENT: Primary | ICD-10-CM

## 2025-04-29 DIAGNOSIS — K90.0 CELIAC SPRUE: ICD-10-CM

## 2025-04-29 DIAGNOSIS — F32.5 MAJOR DEPRESSIVE DISORDER WITH SINGLE EPISODE, IN FULL REMISSION: ICD-10-CM

## 2025-04-29 DIAGNOSIS — E55.9 VITAMIN D INSUFFICIENCY: ICD-10-CM

## 2025-04-29 DIAGNOSIS — R73.9 HYPERGLYCEMIA: ICD-10-CM

## 2025-04-29 DIAGNOSIS — F60.9 PERSONALITY DISORDER (HCC): ICD-10-CM

## 2025-04-29 DIAGNOSIS — E53.8 LOW FOLIC ACID: ICD-10-CM

## 2025-04-29 DIAGNOSIS — R79.89 ELEVATED SERUM CREATININE: ICD-10-CM

## 2025-04-29 PROCEDURE — 99215 OFFICE O/P EST HI 40 MIN: CPT | Performed by: INTERNAL MEDICINE

## 2025-04-30 ENCOUNTER — TRANSCRIBE ORDERS (OUTPATIENT)
Dept: ADMINISTRATIVE | Age: 65
End: 2025-04-30

## 2025-04-30 DIAGNOSIS — F02.80 DEMENTIA IN ALZHEIMER'S DISEASE WITH EARLY ONSET (HCC): Primary | ICD-10-CM

## 2025-04-30 DIAGNOSIS — G30.0 DEMENTIA IN ALZHEIMER'S DISEASE WITH EARLY ONSET (HCC): Primary | ICD-10-CM

## 2025-05-03 NOTE — ASSESSMENT & PLAN NOTE
Continue with gluten-free diet.  Continue to monitor B12, folic acid, vitamin D, and celiac antibody levels at least yearly.

## 2025-05-03 NOTE — ASSESSMENT & PLAN NOTE
Patient was diagnosed with a personality disorder per Dr. Magaña's report in 2023.  Recommendations were made from counseling but she did not follow through.

## 2025-05-03 NOTE — ASSESSMENT & PLAN NOTE
Neuropsych testing on 1/7/2024 had results consistent with mild to moderate major neurocognitive disorder.  Findings of significant loss of executive function and memory.  MRI 2/14/2025 with chronic left temporal lobe infarct (patient has known head trauma resulting in loss of consciousness at age 14) and mild multifocal white matter changes involving cerebral hemispheres bilaterally, compatible with mild chronic ischemic changes.  A presumptive diagnosis of early onset Alzheimer's dementia, moderate severity was made.  Additional testing ordered including tau protein and PET scan of brain.  Testing has not been completed as patient's brother's been having difficulty getting these tests done at in network facilities ( is apparently out of network for patient's insurance).  Discussed with patient's brother that I do not think that [facility] does the required testing however my office will try to investigate to see if she is able to get this testing done at [facility].

## 2025-05-12 DIAGNOSIS — R79.89 ELEVATED SERUM CREATININE: ICD-10-CM

## 2025-05-13 LAB
ANION GAP SERPL CALCULATED.3IONS-SCNC: 12 MMOL/L (ref 3–16)
BUN SERPL-MCNC: 11 MG/DL (ref 7–20)
CALCIUM SERPL-MCNC: 9.4 MG/DL (ref 8.3–10.6)
CHLORIDE SERPL-SCNC: 102 MMOL/L (ref 99–110)
CO2 SERPL-SCNC: 28 MMOL/L (ref 21–32)
CREAT SERPL-MCNC: 0.8 MG/DL (ref 0.6–1.2)
GFR SERPLBLD CREATININE-BSD FMLA CKD-EPI: 83 ML/MIN/{1.73_M2}
GLUCOSE SERPL-MCNC: 80 MG/DL (ref 70–99)
POTASSIUM SERPL-SCNC: 4 MMOL/L (ref 3.5–5.1)
SODIUM SERPL-SCNC: 142 MMOL/L (ref 136–145)

## 2025-05-14 ENCOUNTER — RESULTS FOLLOW-UP (OUTPATIENT)
Dept: INTERNAL MEDICINE CLINIC | Age: 65
End: 2025-05-14

## (undated) DEVICE — 60 ML SYRINGE,REGULAR TIP: Brand: MONOJECT

## (undated) DEVICE — BW-412T DISP COMBO CLEANING BRUSH: Brand: SINGLE USE COMBINATION CLEANING BRUSH

## (undated) DEVICE — PROCEDURE KIT ENDOSCP CUST

## (undated) DEVICE — GOWN AURORA NONREINF LG: Brand: MEDLINE INDUSTRIES, INC.

## (undated) DEVICE — SOLUTION IV IRRIG WATER 500ML POUR BRL ST 2F7113